# Patient Record
Sex: FEMALE | Race: WHITE | Employment: OTHER | URBAN - METROPOLITAN AREA
[De-identification: names, ages, dates, MRNs, and addresses within clinical notes are randomized per-mention and may not be internally consistent; named-entity substitution may affect disease eponyms.]

---

## 2021-01-01 ENCOUNTER — APPOINTMENT (OUTPATIENT)
Dept: CT IMAGING | Age: 86
DRG: 871 | End: 2021-01-01
Attending: FAMILY MEDICINE
Payer: MEDICARE

## 2021-01-01 ENCOUNTER — APPOINTMENT (OUTPATIENT)
Dept: GENERAL RADIOLOGY | Age: 86
DRG: 871 | End: 2021-01-01
Attending: EMERGENCY MEDICINE
Payer: MEDICARE

## 2021-01-01 ENCOUNTER — HOSPITAL ENCOUNTER (INPATIENT)
Age: 86
LOS: 2 days | DRG: 871 | End: 2022-01-01
Attending: EMERGENCY MEDICINE | Admitting: FAMILY MEDICINE
Payer: MEDICARE

## 2021-01-01 ENCOUNTER — APPOINTMENT (OUTPATIENT)
Dept: CT IMAGING | Age: 86
DRG: 871 | End: 2021-01-01
Attending: EMERGENCY MEDICINE
Payer: MEDICARE

## 2021-01-01 VITALS
SYSTOLIC BLOOD PRESSURE: 133 MMHG | HEART RATE: 97 BPM | BODY MASS INDEX: 30.37 KG/M2 | OXYGEN SATURATION: 95 % | WEIGHT: 177.91 LBS | DIASTOLIC BLOOD PRESSURE: 54 MMHG | RESPIRATION RATE: 22 BRPM | HEIGHT: 64 IN | TEMPERATURE: 97.5 F

## 2021-01-01 DIAGNOSIS — J96.01 ACUTE RESPIRATORY FAILURE WITH HYPOXEMIA (HCC): ICD-10-CM

## 2021-01-01 DIAGNOSIS — G93.41 METABOLIC ENCEPHALOPATHY: Primary | ICD-10-CM

## 2021-01-01 DIAGNOSIS — N39.0 URINARY TRACT INFECTION WITHOUT HEMATURIA, SITE UNSPECIFIED: ICD-10-CM

## 2021-01-01 LAB
ALBUMIN SERPL-MCNC: 2.7 G/DL (ref 3.4–5)
ALBUMIN SERPL-MCNC: 3.6 G/DL (ref 3.4–5)
ALBUMIN/GLOB SERPL: 0.6 {RATIO} (ref 0.8–1.7)
ALBUMIN/GLOB SERPL: 1 {RATIO} (ref 0.8–1.7)
ALP SERPL-CCNC: 65 U/L (ref 45–117)
ALP SERPL-CCNC: 66 U/L (ref 45–117)
ALT SERPL-CCNC: 19 U/L (ref 13–56)
ALT SERPL-CCNC: 26 U/L (ref 13–56)
ANION GAP BLD CALC-SCNC: 13 MMOL/L (ref 10–20)
ANION GAP SERPL CALC-SCNC: 3 MMOL/L (ref 3–18)
ANION GAP SERPL CALC-SCNC: 6 MMOL/L (ref 3–18)
APPEARANCE UR: ABNORMAL
ARTERIAL PATENCY WRIST A: ABNORMAL
ARTERIAL PATENCY WRIST A: POSITIVE
ARTERIAL PATENCY WRIST A: POSITIVE
AST SERPL-CCNC: 22 U/L (ref 10–38)
AST SERPL-CCNC: 32 U/L (ref 10–38)
BACTERIA URNS QL MICRO: ABNORMAL /HPF
BASE DEFICIT BLD-SCNC: 1.2 MMOL/L
BASE DEFICIT BLD-SCNC: 2.1 MMOL/L
BASE EXCESS BLD CALC-SCNC: 1.4 MMOL/L
BASE EXCESS BLD CALC-SCNC: 2.7 MMOL/L
BASOPHILS # BLD: 0 K/UL (ref 0–0.1)
BASOPHILS NFR BLD: 0 % (ref 0–2)
BDY SITE: ABNORMAL
BILIRUB SERPL-MCNC: 0.6 MG/DL (ref 0.2–1)
BILIRUB SERPL-MCNC: 0.7 MG/DL (ref 0.2–1)
BILIRUB UR QL: NEGATIVE
BNP SERPL-MCNC: ABNORMAL PG/ML (ref 0–1800)
BODY TEMPERATURE: 98
BODY TEMPERATURE: 98.2
BUN SERPL-MCNC: 38 MG/DL (ref 7–18)
BUN SERPL-MCNC: 44 MG/DL (ref 7–18)
BUN/CREAT SERPL: 22 (ref 12–20)
BUN/CREAT SERPL: 23 (ref 12–20)
CA-I BLD-MCNC: 1.19 MMOL/L (ref 1.12–1.32)
CALCIUM SERPL-MCNC: 8.8 MG/DL (ref 8.5–10.1)
CALCIUM SERPL-MCNC: 8.9 MG/DL (ref 8.5–10.1)
CHLORIDE BLD-SCNC: 107 MMOL/L (ref 98–107)
CHLORIDE SERPL-SCNC: 112 MMOL/L (ref 100–111)
CHLORIDE SERPL-SCNC: 117 MMOL/L (ref 100–111)
CO2 BLD-SCNC: 30 MMOL/L (ref 19–24)
CO2 SERPL-SCNC: 25 MMOL/L (ref 21–32)
CO2 SERPL-SCNC: 29 MMOL/L (ref 21–32)
COLOR UR: YELLOW
COVID-19 RAPID TEST, COVR: NOT DETECTED
CREAT BLD-MCNC: 2.09 MG/DL (ref 0.6–1.3)
CREAT SERPL-MCNC: 1.7 MG/DL (ref 0.6–1.3)
CREAT SERPL-MCNC: 1.88 MG/DL (ref 0.6–1.3)
CRP SERPL-MCNC: 6.9 MG/DL (ref 0–0.3)
D DIMER PPP FEU-MCNC: 1.06 UG/ML(FEU)
DIFFERENTIAL METHOD BLD: ABNORMAL
EOSINOPHIL # BLD: 0.1 K/UL (ref 0–0.4)
EOSINOPHIL NFR BLD: 1 % (ref 0–5)
EPITH CASTS URNS QL MICRO: ABNORMAL /LPF (ref 0–5)
ERYTHROCYTE [DISTWIDTH] IN BLOOD BY AUTOMATED COUNT: 15.9 % (ref 11.6–14.5)
ERYTHROCYTE [DISTWIDTH] IN BLOOD BY AUTOMATED COUNT: 15.9 % (ref 11.6–14.5)
GAS FLOW.O2 O2 DELIVERY SYS: ABNORMAL L/MIN
GAS FLOW.O2 SETTING OXYMISER: 24 BPM
GLOBULIN SER CALC-MCNC: 3.6 G/DL (ref 2–4)
GLOBULIN SER CALC-MCNC: 4.3 G/DL (ref 2–4)
GLUCOSE BLD STRIP.AUTO-MCNC: 90 MG/DL (ref 70–110)
GLUCOSE BLD-MCNC: 101 MG/DL (ref 65–100)
GLUCOSE SERPL-MCNC: 105 MG/DL (ref 74–99)
GLUCOSE SERPL-MCNC: 83 MG/DL (ref 74–99)
GLUCOSE UR STRIP.AUTO-MCNC: NEGATIVE MG/DL
HCO3 BLD-SCNC: 23.7 MMOL/L (ref 22–26)
HCO3 BLD-SCNC: 26.4 MMOL/L (ref 22–26)
HCO3 BLD-SCNC: 26.5 MMOL/L (ref 22–26)
HCO3 BLD-SCNC: 29.6 MMOL/L (ref 22–26)
HCT VFR BLD AUTO: 29.9 % (ref 35–45)
HCT VFR BLD AUTO: 32.2 % (ref 35–45)
HGB BLD-MCNC: 8.8 G/DL (ref 12–16)
HGB BLD-MCNC: 9.5 G/DL (ref 12–16)
HGB UR QL STRIP: ABNORMAL
IMM GRANULOCYTES # BLD AUTO: 0 K/UL (ref 0–0.04)
IMM GRANULOCYTES NFR BLD AUTO: 1 % (ref 0–0.5)
KETONES UR QL STRIP.AUTO: NEGATIVE MG/DL
LACTATE BLD-SCNC: 0.91 MMOL/L (ref 0.4–2)
LDH SERPL L TO P-CCNC: 434 U/L (ref 81–234)
LEUKOCYTE ESTERASE UR QL STRIP.AUTO: ABNORMAL
LIPASE SERPL-CCNC: 146 U/L (ref 73–393)
LYMPHOCYTES # BLD: 0.9 K/UL (ref 0.9–3.6)
LYMPHOCYTES NFR BLD: 10 % (ref 21–52)
MAGNESIUM SERPL-MCNC: 2.6 MG/DL (ref 1.6–2.6)
MCH RBC QN AUTO: 32 PG (ref 24–34)
MCH RBC QN AUTO: 33 PG (ref 24–34)
MCHC RBC AUTO-ENTMCNC: 29.4 G/DL (ref 31–37)
MCHC RBC AUTO-ENTMCNC: 29.5 G/DL (ref 31–37)
MCV RBC AUTO: 108.4 FL (ref 78–100)
MCV RBC AUTO: 112 FL (ref 78–100)
MONOCYTES # BLD: 0.6 K/UL (ref 0.05–1.2)
MONOCYTES NFR BLD: 7 % (ref 3–10)
NEUTS SEG # BLD: 7.1 K/UL (ref 1.8–8)
NEUTS SEG NFR BLD: 82 % (ref 40–73)
NITRITE UR QL STRIP.AUTO: POSITIVE
NRBC # BLD: 0.02 K/UL (ref 0–0.01)
NRBC # BLD: 0.03 K/UL (ref 0–0.01)
NRBC BLD-RTO: 0.2 PER 100 WBC
NRBC BLD-RTO: 0.3 PER 100 WBC
O2/TOTAL GAS SETTING VFR VENT: 44 %
O2/TOTAL GAS SETTING VFR VENT: 50 %
PCO2 BLD: 42.8 MMHG (ref 35–45)
PCO2 BLD: 44.5 MMHG (ref 35–45)
PCO2 BLD: 58.5 MMHG (ref 35–45)
PCO2 BLDV: 56.2 MMHG (ref 41–51)
PH BLD: 7.26 [PH] (ref 7.35–7.45)
PH BLD: 7.34 [PH] (ref 7.35–7.45)
PH BLD: 7.4 [PH] (ref 7.35–7.45)
PH BLDV: 7.33 [PH] (ref 7.32–7.42)
PH UR STRIP: 5 [PH] (ref 5–8)
PLATELET # BLD AUTO: 232 K/UL (ref 135–420)
PLATELET # BLD AUTO: 242 K/UL (ref 135–420)
PMV BLD AUTO: 10.8 FL (ref 9.2–11.8)
PMV BLD AUTO: 11.3 FL (ref 9.2–11.8)
PO2 BLD: 59 MMHG (ref 80–100)
PO2 BLD: 62 MMHG (ref 80–100)
PO2 BLD: 86 MMHG (ref 80–100)
PO2 BLDV: 34 MMHG (ref 25–40)
POTASSIUM BLD-SCNC: 4.3 MMOL/L (ref 3.5–5.1)
POTASSIUM SERPL-SCNC: 4.4 MMOL/L (ref 3.5–5.5)
POTASSIUM SERPL-SCNC: 4.9 MMOL/L (ref 3.5–5.5)
PROCALCITONIN SERPL-MCNC: 0.06 NG/ML
PROT SERPL-MCNC: 7 G/DL (ref 6.4–8.2)
PROT SERPL-MCNC: 7.2 G/DL (ref 6.4–8.2)
PROT UR STRIP-MCNC: 30 MG/DL
RBC # BLD AUTO: 2.67 M/UL (ref 4.2–5.3)
RBC # BLD AUTO: 2.97 M/UL (ref 4.2–5.3)
RBC #/AREA URNS HPF: ABNORMAL /HPF (ref 0–5)
SAO2 % BLD: 59 %
SAO2 % BLD: 89.7 % (ref 92–97)
SAO2 % BLD: 90.5 % (ref 92–97)
SAO2 % BLD: 94.8 % (ref 92–97)
SERVICE CMNT-IMP: ABNORMAL
SODIUM BLD-SCNC: 149 MMOL/L (ref 136–145)
SODIUM SERPL-SCNC: 144 MMOL/L (ref 136–145)
SODIUM SERPL-SCNC: 148 MMOL/L (ref 136–145)
SOURCE, COVRS: NORMAL
SP GR UR REFRACTOMETRY: 1.02 (ref 1–1.03)
SPECIMEN SITE: ABNORMAL
SPECIMEN TYPE: ABNORMAL
TOTAL RESP. RATE, ITRR: 28
TROPONIN-HIGH SENSITIVITY: 37 NG/L (ref 0–54)
TSH SERPL DL<=0.05 MIU/L-ACNC: 2.57 UIU/ML (ref 0.36–3.74)
UROBILINOGEN UR QL STRIP.AUTO: 0.2 EU/DL (ref 0.2–1)
WBC # BLD AUTO: 11.8 K/UL (ref 4.6–13.2)
WBC # BLD AUTO: 8.7 K/UL (ref 4.6–13.2)
WBC URNS QL MICRO: ABNORMAL /HPF (ref 0–5)

## 2021-01-01 PROCEDURE — 96375 TX/PRO/DX INJ NEW DRUG ADDON: CPT

## 2021-01-01 PROCEDURE — 94640 AIRWAY INHALATION TREATMENT: CPT

## 2021-01-01 PROCEDURE — 83690 ASSAY OF LIPASE: CPT

## 2021-01-01 PROCEDURE — 87040 BLOOD CULTURE FOR BACTERIA: CPT

## 2021-01-01 PROCEDURE — 85379 FIBRIN DEGRADATION QUANT: CPT

## 2021-01-01 PROCEDURE — 82803 BLOOD GASES ANY COMBINATION: CPT

## 2021-01-01 PROCEDURE — 77010033678 HC OXYGEN DAILY

## 2021-01-01 PROCEDURE — 71045 X-RAY EXAM CHEST 1 VIEW: CPT

## 2021-01-01 PROCEDURE — 85025 COMPLETE CBC W/AUTO DIFF WBC: CPT

## 2021-01-01 PROCEDURE — 36600 WITHDRAWAL OF ARTERIAL BLOOD: CPT

## 2021-01-01 PROCEDURE — 96361 HYDRATE IV INFUSION ADD-ON: CPT

## 2021-01-01 PROCEDURE — 96365 THER/PROPH/DIAG IV INF INIT: CPT

## 2021-01-01 PROCEDURE — 81001 URINALYSIS AUTO W/SCOPE: CPT

## 2021-01-01 PROCEDURE — 74011000250 HC RX REV CODE- 250: Performed by: FAMILY MEDICINE

## 2021-01-01 PROCEDURE — 82962 GLUCOSE BLOOD TEST: CPT

## 2021-01-01 PROCEDURE — 93005 ELECTROCARDIOGRAM TRACING: CPT

## 2021-01-01 PROCEDURE — P9047 ALBUMIN (HUMAN), 25%, 50ML: HCPCS | Performed by: FAMILY MEDICINE

## 2021-01-01 PROCEDURE — 82947 ASSAY GLUCOSE BLOOD QUANT: CPT

## 2021-01-01 PROCEDURE — 87186 SC STD MICRODIL/AGAR DIL: CPT

## 2021-01-01 PROCEDURE — 70450 CT HEAD/BRAIN W/O DYE: CPT

## 2021-01-01 PROCEDURE — 96360 HYDRATION IV INFUSION INIT: CPT

## 2021-01-01 PROCEDURE — 83735 ASSAY OF MAGNESIUM: CPT

## 2021-01-01 PROCEDURE — 86140 C-REACTIVE PROTEIN: CPT

## 2021-01-01 PROCEDURE — 83615 LACTATE (LD) (LDH) ENZYME: CPT

## 2021-01-01 PROCEDURE — 71250 CT THORAX DX C-: CPT

## 2021-01-01 PROCEDURE — 84145 PROCALCITONIN (PCT): CPT

## 2021-01-01 PROCEDURE — 74011250636 HC RX REV CODE- 250/636: Performed by: EMERGENCY MEDICINE

## 2021-01-01 PROCEDURE — 84484 ASSAY OF TROPONIN QUANT: CPT

## 2021-01-01 PROCEDURE — 83880 ASSAY OF NATRIURETIC PEPTIDE: CPT

## 2021-01-01 PROCEDURE — 74011000258 HC RX REV CODE- 258: Performed by: EMERGENCY MEDICINE

## 2021-01-01 PROCEDURE — 74011250636 HC RX REV CODE- 250/636: Performed by: FAMILY MEDICINE

## 2021-01-01 PROCEDURE — 74011250636 HC RX REV CODE- 250/636: Performed by: INTERNAL MEDICINE

## 2021-01-01 PROCEDURE — 36415 COLL VENOUS BLD VENIPUNCTURE: CPT

## 2021-01-01 PROCEDURE — 87635 SARS-COV-2 COVID-19 AMP PRB: CPT

## 2021-01-01 PROCEDURE — 99285 EMERGENCY DEPT VISIT HI MDM: CPT

## 2021-01-01 PROCEDURE — 65660000000 HC RM CCU STEPDOWN

## 2021-01-01 PROCEDURE — 76450000000

## 2021-01-01 PROCEDURE — 80053 COMPREHEN METABOLIC PANEL: CPT

## 2021-01-01 PROCEDURE — 87077 CULTURE AEROBIC IDENTIFY: CPT

## 2021-01-01 PROCEDURE — 85027 COMPLETE CBC AUTOMATED: CPT

## 2021-01-01 PROCEDURE — 77030013140 HC MSK NEB VYRM -A

## 2021-01-01 PROCEDURE — 87086 URINE CULTURE/COLONY COUNT: CPT

## 2021-01-01 PROCEDURE — 84443 ASSAY THYROID STIM HORMONE: CPT

## 2021-01-01 RX ORDER — DEXAMETHASONE SODIUM PHOSPHATE 100 MG/10ML
10 INJECTION INTRAMUSCULAR; INTRAVENOUS EVERY 24 HOURS
Status: DISCONTINUED | OUTPATIENT
Start: 2022-01-05 | End: 2021-01-01

## 2021-01-01 RX ORDER — ACETAMINOPHEN 650 MG/1
650 SUPPOSITORY RECTAL
Status: DISCONTINUED | OUTPATIENT
Start: 2021-01-01 | End: 2021-01-01

## 2021-01-01 RX ORDER — ONDANSETRON 2 MG/ML
4 INJECTION INTRAMUSCULAR; INTRAVENOUS
Status: DISCONTINUED | OUTPATIENT
Start: 2021-01-01 | End: 2022-01-01 | Stop reason: HOSPADM

## 2021-01-01 RX ORDER — ONDANSETRON 2 MG/ML
4 INJECTION INTRAMUSCULAR; INTRAVENOUS
Status: DISCONTINUED | OUTPATIENT
Start: 2021-01-01 | End: 2021-01-01

## 2021-01-01 RX ORDER — LEVALBUTEROL INHALATION SOLUTION 0.63 MG/3ML
0.63 SOLUTION RESPIRATORY (INHALATION)
Status: DISCONTINUED | OUTPATIENT
Start: 2021-01-01 | End: 2022-01-01 | Stop reason: HOSPADM

## 2021-01-01 RX ORDER — MORPHINE SULFATE 20 MG/ML
10 SOLUTION ORAL
Status: DISCONTINUED | OUTPATIENT
Start: 2021-01-01 | End: 2022-01-01 | Stop reason: HOSPADM

## 2021-01-01 RX ORDER — ACETAMINOPHEN 325 MG/1
650 TABLET ORAL
Status: DISCONTINUED | OUTPATIENT
Start: 2021-01-01 | End: 2021-01-01 | Stop reason: SDUPTHER

## 2021-01-01 RX ORDER — ONDANSETRON 4 MG/1
4 TABLET, ORALLY DISINTEGRATING ORAL
Status: DISCONTINUED | OUTPATIENT
Start: 2021-01-01 | End: 2022-01-01 | Stop reason: HOSPADM

## 2021-01-01 RX ORDER — HALOPERIDOL 5 MG/ML
2 INJECTION INTRAMUSCULAR
Status: DISCONTINUED | OUTPATIENT
Start: 2021-01-01 | End: 2022-01-01 | Stop reason: HOSPADM

## 2021-01-01 RX ORDER — ALBUMIN HUMAN 250 G/1000ML
25 SOLUTION INTRAVENOUS EVERY 6 HOURS
Status: DISCONTINUED | OUTPATIENT
Start: 2021-01-01 | End: 2021-01-01

## 2021-01-01 RX ORDER — LORAZEPAM 2 MG/ML
1 INJECTION INTRAMUSCULAR
Status: DISCONTINUED | OUTPATIENT
Start: 2021-01-01 | End: 2022-01-01 | Stop reason: HOSPADM

## 2021-01-01 RX ORDER — LANOLIN ALCOHOL/MO/W.PET/CERES
1000 CREAM (GRAM) TOPICAL DAILY
Status: DISCONTINUED | OUTPATIENT
Start: 2021-01-01 | End: 2021-01-01

## 2021-01-01 RX ORDER — POLYETHYLENE GLYCOL 3350 17 G/17G
17 POWDER, FOR SOLUTION ORAL DAILY PRN
Status: DISCONTINUED | OUTPATIENT
Start: 2021-01-01 | End: 2022-01-01 | Stop reason: HOSPADM

## 2021-01-01 RX ORDER — ACETAMINOPHEN 650 MG/1
650 SUPPOSITORY RECTAL
Status: DISCONTINUED | OUTPATIENT
Start: 2021-01-01 | End: 2021-01-01 | Stop reason: SDUPTHER

## 2021-01-01 RX ORDER — ACETAMINOPHEN 325 MG/1
650 TABLET ORAL
Status: DISCONTINUED | OUTPATIENT
Start: 2021-01-01 | End: 2021-01-01

## 2021-01-01 RX ORDER — ONDANSETRON 2 MG/ML
4 INJECTION INTRAMUSCULAR; INTRAVENOUS
Status: DISCONTINUED | OUTPATIENT
Start: 2021-01-01 | End: 2021-01-01 | Stop reason: SDUPTHER

## 2021-01-01 RX ORDER — LEVOFLOXACIN 5 MG/ML
500 INJECTION, SOLUTION INTRAVENOUS
Status: DISCONTINUED | OUTPATIENT
Start: 2022-01-01 | End: 2021-01-01

## 2021-01-01 RX ORDER — ATORVASTATIN CALCIUM 20 MG/1
40 TABLET, FILM COATED ORAL
Status: DISCONTINUED | OUTPATIENT
Start: 2021-01-01 | End: 2021-01-01

## 2021-01-01 RX ORDER — SODIUM CHLORIDE 0.9 % (FLUSH) 0.9 %
5-40 SYRINGE (ML) INJECTION EVERY 8 HOURS
Status: DISCONTINUED | OUTPATIENT
Start: 2021-01-01 | End: 2021-01-01 | Stop reason: SDUPTHER

## 2021-01-01 RX ORDER — IPRATROPIUM BROMIDE AND ALBUTEROL SULFATE 2.5; .5 MG/3ML; MG/3ML
3 SOLUTION RESPIRATORY (INHALATION)
Status: DISCONTINUED | OUTPATIENT
Start: 2021-01-01 | End: 2021-01-01

## 2021-01-01 RX ORDER — NALOXONE HYDROCHLORIDE 1 MG/ML
1 INJECTION INTRAMUSCULAR; INTRAVENOUS; SUBCUTANEOUS
Status: COMPLETED | OUTPATIENT
Start: 2021-01-01 | End: 2021-01-01

## 2021-01-01 RX ORDER — LORAZEPAM 2 MG/ML
1 CONCENTRATE ORAL
Status: DISCONTINUED | OUTPATIENT
Start: 2021-01-01 | End: 2022-01-01

## 2021-01-01 RX ORDER — SODIUM CHLORIDE 0.9 % (FLUSH) 0.9 %
5-40 SYRINGE (ML) INJECTION AS NEEDED
Status: DISCONTINUED | OUTPATIENT
Start: 2021-01-01 | End: 2021-01-01 | Stop reason: SDUPTHER

## 2021-01-01 RX ORDER — LEVOFLOXACIN 5 MG/ML
750 INJECTION, SOLUTION INTRAVENOUS EVERY 24 HOURS
Status: DISCONTINUED | OUTPATIENT
Start: 2021-01-01 | End: 2021-01-01 | Stop reason: DRUGHIGH

## 2021-01-01 RX ORDER — ONDANSETRON 4 MG/1
4 TABLET, ORALLY DISINTEGRATING ORAL
Status: DISCONTINUED | OUTPATIENT
Start: 2021-01-01 | End: 2021-01-01 | Stop reason: SDUPTHER

## 2021-01-01 RX ORDER — SODIUM CHLORIDE 0.9 % (FLUSH) 0.9 %
5-40 SYRINGE (ML) INJECTION AS NEEDED
Status: DISCONTINUED | OUTPATIENT
Start: 2021-01-01 | End: 2021-01-01

## 2021-01-01 RX ORDER — LEVOFLOXACIN 5 MG/ML
750 INJECTION, SOLUTION INTRAVENOUS ONCE
Status: COMPLETED | OUTPATIENT
Start: 2021-01-01 | End: 2021-01-01

## 2021-01-01 RX ORDER — SODIUM CHLORIDE 0.9 % (FLUSH) 0.9 %
5-40 SYRINGE (ML) INJECTION EVERY 8 HOURS
Status: DISCONTINUED | OUTPATIENT
Start: 2021-01-01 | End: 2021-01-01

## 2021-01-01 RX ORDER — MORPHINE SULFATE 2 MG/ML
2 INJECTION, SOLUTION INTRAMUSCULAR; INTRAVENOUS
Status: DISCONTINUED | OUTPATIENT
Start: 2021-01-01 | End: 2022-01-01 | Stop reason: HOSPADM

## 2021-01-01 RX ADMIN — Medication 10 ML: at 06:05

## 2021-01-01 RX ADMIN — ALBUMIN (HUMAN) 25 G: 0.25 INJECTION, SOLUTION INTRAVENOUS at 12:05

## 2021-01-01 RX ADMIN — SODIUM CHLORIDE 1000 ML: 900 INJECTION, SOLUTION INTRAVENOUS at 02:18

## 2021-01-01 RX ADMIN — SODIUM CHLORIDE 1000 ML: 900 INJECTION, SOLUTION INTRAVENOUS at 04:17

## 2021-01-01 RX ADMIN — SODIUM CHLORIDE 1000 ML: 900 INJECTION, SOLUTION INTRAVENOUS at 02:17

## 2021-01-01 RX ADMIN — CEFEPIME HYDROCHLORIDE 2 G: 2 INJECTION, POWDER, FOR SOLUTION INTRAVENOUS at 04:23

## 2021-01-01 RX ADMIN — ALBUMIN (HUMAN) 25 G: 0.25 INJECTION, SOLUTION INTRAVENOUS at 17:46

## 2021-01-01 RX ADMIN — LORAZEPAM 1 MG: 2 INJECTION INTRAMUSCULAR; INTRAVENOUS at 22:19

## 2021-01-01 RX ADMIN — CEFEPIME HYDROCHLORIDE 2 G: 2 INJECTION, POWDER, FOR SOLUTION INTRAVENOUS at 04:50

## 2021-01-01 RX ADMIN — MORPHINE SULFATE 2 MG: 2 INJECTION, SOLUTION INTRAMUSCULAR; INTRAVENOUS at 22:36

## 2021-01-01 RX ADMIN — ALBUMIN (HUMAN) 25 G: 0.25 INJECTION, SOLUTION INTRAVENOUS at 23:21

## 2021-01-01 RX ADMIN — FAMOTIDINE 20 MG: 10 INJECTION, SOLUTION INTRAVENOUS at 09:22

## 2021-01-01 RX ADMIN — ALBUMIN (HUMAN) 25 G: 0.25 INJECTION, SOLUTION INTRAVENOUS at 05:24

## 2021-01-01 RX ADMIN — MORPHINE SULFATE 2 MG: 2 INJECTION, SOLUTION INTRAMUSCULAR; INTRAVENOUS at 14:53

## 2021-01-01 RX ADMIN — LEVALBUTEROL HYDROCHLORIDE 0.63 MG: 0.63 SOLUTION RESPIRATORY (INHALATION) at 05:27

## 2021-01-01 RX ADMIN — ALBUMIN (HUMAN) 25 G: 0.25 INJECTION, SOLUTION INTRAVENOUS at 12:26

## 2021-01-01 RX ADMIN — HALOPERIDOL LACTATE 2 MG: 5 INJECTION, SOLUTION INTRAMUSCULAR at 08:31

## 2021-01-01 RX ADMIN — FAMOTIDINE 20 MG: 10 INJECTION, SOLUTION INTRAVENOUS at 08:31

## 2021-01-01 RX ADMIN — LEVOFLOXACIN 750 MG: 5 INJECTION, SOLUTION INTRAVENOUS at 06:08

## 2021-01-01 RX ADMIN — LORAZEPAM 1 MG: 2 INJECTION INTRAMUSCULAR; INTRAVENOUS at 14:53

## 2021-01-01 RX ADMIN — NALOXONE HYDROCHLORIDE 1 MG: 1 INJECTION PARENTERAL at 02:09

## 2021-12-30 PROBLEM — N18.9 ACUTE ON CHRONIC RENAL FAILURE (HCC): Status: ACTIVE | Noted: 2021-01-01

## 2021-12-30 PROBLEM — J96.01 ACUTE RESPIRATORY FAILURE WITH HYPOXEMIA (HCC): Status: ACTIVE | Noted: 2021-01-01

## 2021-12-30 PROBLEM — F03.90 DEMENTIA (HCC): Status: ACTIVE | Noted: 2021-01-01

## 2021-12-30 PROBLEM — E87.0 HYPERNATREMIA: Status: ACTIVE | Noted: 2021-01-01

## 2021-12-30 PROBLEM — G93.41 ACUTE METABOLIC ENCEPHALOPATHY: Status: ACTIVE | Noted: 2021-01-01

## 2021-12-30 PROBLEM — T68.XXXA HYPOTHERMIA: Status: ACTIVE | Noted: 2021-01-01

## 2021-12-30 PROBLEM — I10 HTN (HYPERTENSION): Status: ACTIVE | Noted: 2021-01-01

## 2021-12-30 PROBLEM — Z79.01 ANTICOAGULATED: Status: ACTIVE | Noted: 2021-01-01

## 2021-12-30 PROBLEM — N18.30 CKD (CHRONIC KIDNEY DISEASE) STAGE 3, GFR 30-59 ML/MIN (HCC): Status: ACTIVE | Noted: 2021-01-01

## 2021-12-30 PROBLEM — N17.9 ACUTE ON CHRONIC RENAL FAILURE (HCC): Status: ACTIVE | Noted: 2021-01-01

## 2021-12-30 PROBLEM — N39.0 UTI (URINARY TRACT INFECTION): Status: ACTIVE | Noted: 2021-01-01

## 2021-12-30 PROBLEM — Z86.73 HISTORY OF STROKE: Status: ACTIVE | Noted: 2021-01-01

## 2021-12-30 PROBLEM — I48.20 CHRONIC ATRIAL FIBRILLATION (HCC): Status: ACTIVE | Noted: 2021-01-01

## 2021-12-30 NOTE — ED PROVIDER NOTES
EMERGENCY DEPARTMENT HISTORY AND PHYSICAL EXAM    1:18 AM    Date: 12/30/2021  Patient Name: Clara Cadena    History of Presenting Illness     Chief Complaint   Patient presents with    Shortness of Breath       History Provided By: Patient  Location/Duration/Severity/Modifying factors   Patient is a 51-year-old female who presents to the ED with a chief complaint of seemingly altered mental status. Patient comes from a nursing care facility. Per the facility the patient typically at baseline is disoriented but conversant. Patient reportedly this evening patient became altered and reportedly scratching her tongue and they checked her oxygen level and it was in the low 80s. That was when they dispatched EMS. The patient is quite encephalopathic appearing and not able to provide a history. Per EMS the patient's sat was 83% patient on 6 L and then transferred to a nonrebreather which improved her saturations to the upper 90s however on arrival here she is on room air with a saturation of 90%. Patient reportedly was saying that her tongue was swelling although on exam the tongue looks to be dry but normal otherwise.           PCP: Igor Ingram MD    Current Facility-Administered Medications   Medication Dose Route Frequency Provider Last Rate Last Admin    [START ON 1/1/2022] levoFLOXacin (LEVAQUIN) 500 mg in D5W IVPB  500 mg IntraVENous Q48H Franc Cherry DO        cefepime (MAXIPIME) 2 g in 0.9% sodium chloride (MBP/ADV) 100 mL MBP  2 g IntraVENous Q24H Aniya Snyder DO   IV Completed at 12/30/21 0608    sodium chloride (NS) flush 5-40 mL  5-40 mL IntraVENous Q8H Justine Moran MD   10 mL at 12/30/21 8952    sodium chloride (NS) flush 5-40 mL  5-40 mL IntraVENous PRN Justine Moran MD        acetaminophen (TYLENOL) tablet 650 mg  650 mg Oral Q6H PRN Justine Moran MD        Or    acetaminophen (TYLENOL) suppository 650 mg  650 mg Rectal Q6H PRN Justine Moran MD  polyethylene glycol (MIRALAX) packet 17 g  17 g Oral DAILY PRN Liz Rodriguez MD        ondansetron (ZOFRAN ODT) tablet 4 mg  4 mg Oral Q8H PRN Liz Rodriguez MD        Or    ondansetron [facility]) injection 4 mg  4 mg IntraVENous Q6H PRN Liz Rodriguez MD        famotidine (PF) (PEPCID) 20 mg in 0.9% sodium chloride 10 mL injection  20 mg IntraVENous DAILY Liz Rodriguez MD   20 mg at 12/30/21 8624    apixaban (ELIQUIS) tablet 2.5 mg  2.5 mg Oral BID Liz Rodriguez MD        atorvastatin (LIPITOR) tablet 40 mg  40 mg Oral QHS Liz Rodriguez MD        cyanocobalamin tablet 1,000 mcg  1,000 mcg Oral DAILY Liz Rodriguez MD        albumin human 25% (BUMINATE) solution 25 g  25 g IntraVENous Q6H Liz Rodriguez MD   25 g at 12/30/21 1205    albuterol-ipratropium (DUO-NEB) 2.5 MG-0.5 MG/3 ML  3 mL Nebulization Q4H PRN Tracie Gandhi MD           Past History     Past Medical History:  Past Medical History:   Diagnosis Date    Anemia     ARF (acute respiratory failure) (HCC)     Atrial fibrillation (HCC)     Cerebral infarction (HCC)     Chronic kidney disease     Dementia (HCC)     Dysphagia     GERD (gastroesophageal reflux disease)     Hypercholesterolemia     Hypertension     Pleural effusion     Polyneuropathy     UTI (urinary tract infection)        Past Surgical History:  Past Surgical History:   Procedure Laterality Date    HX APPENDECTOMY      HX CATARACT REMOVAL      HX ORTHOPAEDIC      L knee, right ankle, joint replacement, hammer toe release, bunionectomy       Family History:  Family History   Problem Relation Age of Onset    Breast Cancer Mother     COPD Father     Breast Cancer Sister        Social History:  Social History     Tobacco Use    Smoking status: Not on file    Smokeless tobacco: Not on file   Substance Use Topics    Alcohol use: Not on file    Drug use: Not on file       Allergies:   Allergies Allergen Reactions    Alteplase Angioedema    Celebrex [Celecoxib] Unknown (comments)     Medication reaction not listed on paperwork from facility    Enalapril Unknown (comments)     Medication reaction not listed on paperwork from facility    Meloxicam Unknown (comments)     Medication reaction not listed on paperwork from facility       I reviewed and confirmed the above information with patient and updated as necessary. Review of Systems     Review of Systems   Unable to perform ROS: Mental status change       Physical Exam     Visit Vitals  BP (!) 122/53 (BP 1 Location: Left upper arm, BP Patient Position: At rest;Lying)   Pulse 77   Temp (!) 96.5 °F (35.8 °C)   Resp 26   Ht 5' 4\" (1.626 m)   Wt 77.1 kg (170 lb)   SpO2 93%   BMI 29.18 kg/m²       Physical Exam  Vitals and nursing note reviewed. Constitutional:       General: She is not in acute distress. Appearance: Normal appearance. She is normal weight. Comments: Acute and chronically ill-appearing, not in overt distress not responding appropriately to questioning. HENT:      Head: Normocephalic and atraumatic. Right Ear: External ear normal.      Left Ear: External ear normal.      Nose: Nose normal.      Mouth/Throat:      Mouth: Mucous membranes are moist.   Eyes:      Conjunctiva/sclera: Conjunctivae normal.   Cardiovascular:      Rate and Rhythm: Normal rate and regular rhythm. Pulses: Normal pulses. Heart sounds: Normal heart sounds. No murmur heard. Pulmonary:      Effort: Pulmonary effort is normal.      Breath sounds: Normal breath sounds. No wheezing, rhonchi or rales. Abdominal:      General: Abdomen is flat. Tenderness: There is no abdominal tenderness. There is no guarding or rebound. Musculoskeletal:         General: No swelling or tenderness. Normal range of motion. Cervical back: Normal range of motion and neck supple. Right lower leg: No edema. Left lower leg: No edema. Skin:     General: Skin is warm and dry. Capillary Refill: Capillary refill takes less than 2 seconds. Findings: No rash. Neurological:      General: No focal deficit present. Mental Status: She is alert. Diagnostic Study Results     Labs -  Recent Results (from the past 24 hour(s))   BLOOD GAS, ARTERIAL POC    Collection Time: 12/30/21  1:30 AM   Result Value Ref Range    Device: NASAL CANNULA      pH (POC) 7.40 7.35 - 7.45      pCO2 (POC) 42.8 35.0 - 45.0 MMHG    pO2 (POC) 59 (L) 80 - 100 MMHG    HCO3 (POC) 26.5 (H) 22 - 26 MMOL/L    sO2 (POC) 90.5 (L) 92 - 97 %    Base excess (POC) 1.4 mmol/L    Allens test (POC) Positive      Site RIGHT RADIAL      Patient temp. 98      Specimen type (POC) ARTERIAL      Performed by Ermelinda Clifton    EKG, 12 LEAD, INITIAL    Collection Time: 12/30/21  1:35 AM   Result Value Ref Range    Ventricular Rate 75 BPM    QRS Duration 82 ms    Q-T Interval 398 ms    QTC Calculation (Bezet) 444 ms    Calculated R Axis -29 degrees    Calculated T Axis -4 degrees    Diagnosis       Atrial fibrillation  Abnormal ECG  No previous ECGs available     CBC WITH AUTOMATED DIFF    Collection Time: 12/30/21  2:02 AM   Result Value Ref Range    WBC 8.7 4.6 - 13.2 K/uL    RBC 2.97 (L) 4.20 - 5.30 M/uL    HGB 9.5 (L) 12.0 - 16.0 g/dL    HCT 32.2 (L) 35.0 - 45.0 %    .4 (H) 78.0 - 100.0 FL    MCH 32.0 24.0 - 34.0 PG    MCHC 29.5 (L) 31.0 - 37.0 g/dL    RDW 15.9 (H) 11.6 - 14.5 %    PLATELET 468 921 - 801 K/uL    MPV 11.3 9.2 - 11.8 FL    NRBC 0.2 (H) 0  WBC    ABSOLUTE NRBC 0.02 (H) 0.00 - 0.01 K/uL    NEUTROPHILS 82 (H) 40 - 73 %    LYMPHOCYTES 10 (L) 21 - 52 %    MONOCYTES 7 3 - 10 %    EOSINOPHILS 1 0 - 5 %    BASOPHILS 0 0 - 2 %    IMMATURE GRANULOCYTES 1 (H) 0.0 - 0.5 %    ABS. NEUTROPHILS 7.1 1.8 - 8.0 K/UL    ABS. LYMPHOCYTES 0.9 0.9 - 3.6 K/UL    ABS. MONOCYTES 0.6 0.05 - 1.2 K/UL    ABS. EOSINOPHILS 0.1 0.0 - 0.4 K/UL    ABS.  BASOPHILS 0.0 0.0 - 0.1 K/UL ABS. IMM. GRANS. 0.0 0.00 - 0.04 K/UL    DF AUTOMATED     METABOLIC PANEL, COMPREHENSIVE    Collection Time: 12/30/21  2:02 AM   Result Value Ref Range    Sodium 144 136 - 145 mmol/L    Potassium 4.9 3.5 - 5.5 mmol/L    Chloride 112 (H) 100 - 111 mmol/L    CO2 29 21 - 32 mmol/L    Anion gap 3 3.0 - 18 mmol/L    Glucose 105 (H) 74 - 99 mg/dL    BUN 44 (H) 7.0 - 18 MG/DL    Creatinine 1.88 (H) 0.6 - 1.3 MG/DL    BUN/Creatinine ratio 23 (H) 12 - 20      GFR est AA 30 (L) >60 ml/min/1.73m2    GFR est non-AA 25 (L) >60 ml/min/1.73m2    Calcium 8.9 8.5 - 10.1 MG/DL    Bilirubin, total 0.7 0.2 - 1.0 MG/DL    ALT (SGPT) 19 13 - 56 U/L    AST (SGOT) 32 10 - 38 U/L    Alk.  phosphatase 65 45 - 117 U/L    Protein, total 7.0 6.4 - 8.2 g/dL    Albumin 2.7 (L) 3.4 - 5.0 g/dL    Globulin 4.3 (H) 2.0 - 4.0 g/dL    A-G Ratio 0.6 (L) 0.8 - 1.7     NT-PRO BNP    Collection Time: 12/30/21  2:02 AM   Result Value Ref Range    NT pro-BNP 11,027 (H) 0 - 1,800 PG/ML   TROPONIN-HIGH SENSITIVITY    Collection Time: 12/30/21  2:02 AM   Result Value Ref Range    Troponin-High Sensitivity 37 0 - 54 ng/L   LIPASE    Collection Time: 12/30/21  2:02 AM   Result Value Ref Range    Lipase 146 73 - 393 U/L   MAGNESIUM    Collection Time: 12/30/21  2:02 AM   Result Value Ref Range    Magnesium 2.6 1.6 - 2.6 mg/dL   CULTURE, BLOOD    Collection Time: 12/30/21  2:02 AM    Specimen: Blood   Result Value Ref Range    Special Requests: LEFT FOREARM      Culture result: NO GROWTH AFTER 8 HOURS     CULTURE, BLOOD    Collection Time: 12/30/21  2:02 AM    Specimen: Blood   Result Value Ref Range    Special Requests: RIGHT FOREARM      Culture result: NO GROWTH AFTER 8 HOURS     BLOOD GAS,CHEM8,LACTIC ACID POC    Collection Time: 12/30/21  2:02 AM   Result Value Ref Range    Calcium, ionized (POC) 1.19 1.12 - 1.32 mmol/L    Base excess (POC) 2.7 mmol/L    HCO3 (POC) 29.6 (H) 22 - 26 MMOL/L    CO2, POC 30 (H) 19 - 24 MMOL/L    O2 SAT 59 %    Sample source VENOUS BLOOD      Performed by Day Ranci     Sodium (POC) 149 (H) 136 - 145 mmol/L    Potassium (POC) 4.3 3.5 - 5.1 mmol/L    Glucose (POC) 101 (H) 65 - 100 mg/dL    Creatinine (POC) 2.09 (H) 0.6 - 1.3 mg/dL    Lactic Acid (POC) 0.91 0.40 - 2.00 mmol/L    Chloride (POC) 107 98 - 107 mmol/L    Anion gap, POC 13 10 - 20      GFRAA, POC 27 (L) >60 ml/min/1.73m2    GFRNA, POC 22 (L) >60 ml/min/1.73m2    pH, venous (POC) 7.33 7.32 - 7.42      pCO2, venous (POC) 56.2 (H) 41 - 51 MMHG    pO2, venous (POC) 34 25 - 40 mmHg   TSH 3RD GENERATION    Collection Time: 12/30/21  2:02 AM   Result Value Ref Range    TSH 2.57 0.36 - 3.74 uIU/mL   URINALYSIS W/ RFLX MICROSCOPIC    Collection Time: 12/30/21  4:00 AM   Result Value Ref Range    Color YELLOW      Appearance TURBID      Specific gravity 1.019 1.005 - 1.030      pH (UA) 5.0 5.0 - 8.0      Protein 30 (A) NEG mg/dL    Glucose Negative NEG mg/dL    Ketone Negative NEG mg/dL    Bilirubin Negative NEG      Blood SMALL (A) NEG      Urobilinogen 0.2 0.2 - 1.0 EU/dL    Nitrites Positive (A) NEG      Leukocyte Esterase LARGE (A) NEG     URINE MICROSCOPIC ONLY    Collection Time: 12/30/21  4:00 AM   Result Value Ref Range    WBC TOO NUMEROUS TO COUNT 0 - 5 /hpf    RBC 0 to 3 0 - 5 /hpf    Epithelial cells FEW 0 - 5 /lpf    Bacteria 2+ (A) NEG /hpf   COVID-19 RAPID TEST    Collection Time: 12/30/21  5:30 AM   Result Value Ref Range    Specimen source Nasopharyngeal      COVID-19 rapid test Not detected NOTD           Radiologic Studies -   CT HEAD WO CONT   Final Result      1. No CT evidence of an acute intracranial abnormality - in particular, no   acute cortical infarct, hemorrhage, or mass effect. 2.  Moderate cerebral atrophy/volume loss and periventricular white matter   changes, most commonly seen with chronic microvascular disease. XR CHEST PORT   Final Result      No active cardiopulmonary disease. Right midlung atelectasis/scarring.  Probable   calcified right upper lobe granuloma. Medical Decision Making   I am the first provider for this patient. I reviewed the vital signs, available nursing notes, past medical history, past surgical history, family history and social history. Vital Signs-Reviewed the patient's vital signs. EKG: Rate controlled Afib rate 75, no BRONWYN or STD. Overall rate controlled Afib without any overt ischemic changes    Records Reviewed: Nursing Notes, Old Medical Records, Previous Radiology Studies and Previous Laboratory Studies (Time of Review: 1:18 AM)      Provider Notes (Medical Decision Making):   MDM  Number of Diagnoses or Management Options  Metabolic encephalopathy  Urinary tract infection without hematuria, site unspecified  Diagnosis management comments: 81 yo F with AMS and possible SOB  - Metabolic encephalopathy/UTI/Pneumonia/ COVID/Stroke/Hemorrhage, etc  - UA strongly positive. - Patient hypothermic, Shanelle hugger applied and patient had improvement in mentation  - Daughter reports patient believed to be full code, however not sure, but wants intubation if needed right now. Core Measures:  For Hospitalized Patients:    1. Hospitalization Decision Time:  The decision to hospitalize the patient was made by Dr. Ismael Read at 0500  on 12/30/2021    2. Aspirin: Aspirin was not given because the patient did not present with a stroke at the time of their Emergency Department evaluation    5:00 AM  Patient is being admitted to the hospital by Dr. Ela Cook-- Hospitalist. The results of their tests and reasons for their admission have been discussed with them and/or available family. They convey agreement and understanding for the need to be admitted and for their admission diagnosis. CONDITIONS ON ADMISSION:  Sepsis is present at the time of admission. Deep Vein Thrombosis is not present at the time of admission. Thrombosis is not present at the time of admission.  Urinary Tract Infection is present at the time of admission. Pneumonia is not present at the time of admission. MRSA is not present at the time of admission. Wound infection is not present at the time of admission. Pressure Ulcer is not present at the time of admission. CLINICAL IMPRESSION:    1. Metabolic encephalopathy    2. Urinary tract infection without hematuria, site unspecified          ED Course: Progress Notes, Reevaluation, and Consults:  ED Course as of 12/30/21 1301   Thu Dec 30, 2021   0221 Patient's daughter arrived in the ED. She tells me the patient actually is a full code although would not want prolonged mechanical ventilation. I asked if the patient need to be intubated right now with unknown cause, if that would be consistent with her wishes and she advised me that it would. Patient is having some difficulty breathing and reluctant to intubate her right now although we will continue to monitor her and she is saturating well and not retaining CO2 and she does seem to be having a somewhat decline from her initial arrival here. Could be potentially a hemorrhagic or ischemic stroke although she had symmetric strength initially timeframe and the presentation overall was not entirely consistent with that however. [RENÉE]      ED Course User Index  [RENÉE] Hemanth William, DO       Procedures    Critical Care Time: CRITICAL CARE NOTE:    I have spent 47 minutes of critical care time involved in lab review, consultations with specialist, family decision-making, and documentation. During this entire length of time I was immediately available to the patient. Critical Care: The reason for providing this level of medical care for this critically ill patient was due a critical illness that impaired one or more vital organ systems such that there was a high probability of imminent or life threatening deterioration in the patients condition.  This care involved high complexity decision making to assess, manipulate, and support vital system functions, to treat this vital organ system failure and to prevent further life threatening deterioration of the patients condition. Time is exclusive of procedural and teaching time. Susanne Woods DO      Diagnosis     Clinical Impression:   1. Metabolic encephalopathy    2. Urinary tract infection without hematuria, site unspecified        Disposition: Admit    Follow-up Information    None          Patient's Medications    No medications on file       Susanne Woods DO   Emergency Medicine   December 30, 2021, 1:18 AM     This note is dictated utilizing Dragon voice recognition software. Unfortunately this leads to occasional typographical errors using the voice recognition. I apologize in advance if the situation occurs. If questions occur please do not hesitate to contact me directly. Patient was seen  and treated during the context of the COVID-19 pandemic. Contemporary protocols utilized based on the best available evidence, utilizing evolving public health  guidelines and treatment protocols.     Susanne Woods DO

## 2021-12-30 NOTE — PROGRESS NOTES
Pharmacy Dosing Services: Renal Dosing    The following medication: Pepcid was automatically dose-adjusted per THE [facility] P&T Committee Protocol, with respect to renal function. Consult provided for this   80 y.o. , female , for the indication of SUP. Pt Weight:   Wt Readings from Last 1 Encounters:   12/30/21 77.1 kg (170 lb)         Previous Regimen Pepcid 20 mg IV BID   Serum Creatinine Lab Results   Component Value Date/Time    Creatinine 1.88 (H) 12/30/2021 02:02 AM    Creatinine (POC) 2.09 (H) 12/30/2021 02:02 AM       Creatinine Clearance Estimated Creatinine Clearance: 18.8 mL/min (A) (based on SCr of 1.88 mg/dL (H)). BUN Lab Results   Component Value Date/Time    BUN 44 (H) 12/30/2021 02:02 AM       Dosage changed to:  Pepcid 20 mg IV daily    Pharmacy to continue to monitor patient daily. Will make dosage adjustments based upon changing renal function.   Signed Yosi Martinez, [address]

## 2021-12-30 NOTE — ED NOTES
Pt appears to be doing better after the sharath neb. 02 is at 93% and patient no longer appears to be in distress.

## 2021-12-30 NOTE — H&P
History & Physical    Patient: Ela Calloway MRN: [id number]  [facility]: [id number]    YOB: 1928  Age: 80 y.o. Sex: female      DOA: 12/30/2021  Primary Care Provider:  Brianna Whitlock MD      Assessment/Plan     Patient Active Problem List   Diagnosis Code    UTI (urinary tract infection) N39.0    Acute metabolic encephalopathy I70.40    Dementia ([facility] [address] F03.90    Hypothermia T68. Olevia Screen    HTN (hypertension) I10    Chronic atrial fibrillation (HCC) I48.20    CKD (chronic kidney disease) stage 3, GFR 30-59 ml/min (HCC) N18.30    Acute on chronic renal failure (HCC) N17.9, N18.9    Acute respiratory failure with hypoxemia (HCC) J96.01    History of stroke Z86.73    Anticoagulated      77-year-old female with advanced dementia, hypertension, chronic atrial fibrillation, CKD stage III, and stroke last month is admitted for acute metabolic encephalopathy, hypothermia, acute on chronic renal failure, acute respiratory failure with hypoxemia, and UTI. Acute metabolic encephalopathy likely due to UTI with hypothermia  -Telemetry  -Shanelle jack  -Albumin for further fluid resuscitation  -Antibiotic treatment with cefepime and Levaquin  -Follow-up blood and urine cultures    Acute respiratory failure with hypoxemia-no evidence of pneumonia on her chest x-ray.   I am suspicious she may have heart failure given her elevated BNP and that she is on Coreg as a home medication as well as torsemide.  -Nasal cannula oxygen as needed  -Is on antibiotics for her UTI  -Echocardiogram to assess EF  -Monitor closely for volume overload as she received 3 L of fluid in the ED    Acute on chronic renal failure-likely due to mild dehydration and UTI  -Trend creatinine  -Avoid nephrotoxins    Chronic atrial fibrillation on anticoagulation  -Hold blood pressure medications as she is hypotensive  -Continue Eliquis    Dementia-severe  -Restraints as needed to keep her safe in bed as she cannot be out of bed and walking around the hospital unassisted  -Swallow study to evaluate for aspiration especially given recent stroke  -I advised her daughter that she would qualify for hospice if she desires this route    DNR-as confirmed by her daughter. She has paperwork at home. I have entered a palliative care consult to help follow the patient during her hospital stay. Family discussion-I advised her daughter that she will likely require sedation or restraints as she is not able to get out of bed here at the hospital.  Her daughter states that she gets out of bed all the time while at rehab and sits that the nurses station. I advised her that every time she has hospitalized she will further decline cognitively and is at risk for delirium. Poor overall prognosis. Prophylaxis: Pepcid IV, Eliquis p.o. Estimated length of stay : 4-5 nights    Tamiko Savage MD  Nocturnist  -----------------------------------------------------------------------------------------------------------------------------------------------------------------------------  CC: Altered mental status, shortness of breath       HPI:     Deven Rhodes is a 80 y.o. female with advanced dementia, hypertension, chronic atrial fibrillation, CKD stage III, and stroke last month who presents via EMS for altered mental status and hypoxia at her rehab facility. The staff found her scratching at her tongue and so they checked her vital signs. Her oxygen level was in the 80s so they brought her to the hospital.  Her daughter reports that she has been in the rehab facility since her discharge last month from [facility] with a stroke. She is not sure if she has been eating or drinking since she is not usually visiting during mealtime. She says that her dementia wax and wanes rapidly. 1 day she will be very happy in the next she will not speak at all. She does not normally have an oxygen requirement.   History is unobtainable from the patient due to dementia. Past Medical History:   Diagnosis Date    Anemia     ARF (acute respiratory failure) ([facility] [address]     Atrial fibrillation (HCC)     Cerebral infarction (HCC)     Chronic kidney disease     Dementia (HCC)     Dysphagia     GERD (gastroesophageal reflux disease)     Hypercholesterolemia     Hypertension     Pleural effusion     Polyneuropathy     UTI (urinary tract infection)        Past Surgical History:   Procedure Laterality Date    HX APPENDECTOMY      HX CATARACT REMOVAL      HX ORTHOPAEDIC      L knee, right ankle, joint replacement, hammer toe release, bunionectomy       Family History   Problem Relation Age of Onset    Breast Cancer Mother     COPD Father     Breast Cancer Sister        Social History     Socioeconomic History    Marital status:        Prior to Admission medications    Not on File       Allergies   Allergen Reactions    Alteplase Angioedema    Celebrex [Celecoxib] Unknown (comments)     Medication reaction not listed on paperwork from facility    Enalapril Unknown (comments)     Medication reaction not listed on paperwork from facility    Meloxicam Unknown (comments)     Medication reaction not listed on paperwork from facility       Review of Systems  Unobtainable due to dementia    Physical Exam:     Physical Exam:  Visit Vitals  BP (!) 122/53 (BP 1 Location: Left upper arm, BP Patient Position: At rest;Lying)   Pulse 77   Temp (!) 95.9 °F (35.5 °C)   Resp 26   Ht 5' 4\" (1.626 m)   Wt 77.1 kg (170 lb)   SpO2 93%   BMI 29.18 kg/m²      O2 Device: None (Room air)    Temp (24hrs), Av.1 °F (35.6 °C), Min:95.4 °F (35.2 °C), Max:97.1 °F (36.2 °C)    No intake/output data recorded. No intake/output data recorded. General:  Awake, climbing out of bed, A&Ox1. Head:  Normocephalic, without obvious abnormality, atraumatic. Eyes:  Conjunctivae/corneas clear, sclera anicteric. Neck: Supple, symmetrical, trachea midline.    Lungs:   Clear to auscultation bilaterally. Heart:  Regular rate and rhythm, S1, S2 normal, no murmur, click, rub or gallop. Abdomen: Soft, non-tender. Bowel sounds normal. No masses,  No organomegaly. Extremities: Extremities normal, atraumatic, no cyanosis. Chronic right leg edema from previous orthopedic surgery. Capillary refill normal.   Pulses: 2+ and symmetric all extremities. Skin: Skin color pink, turgor increased. No rashes or lesions   Neurologic: No focal motor or sensory deficit. Labs Reviewed: All lab results for the last 24 hours reviewed. Recent Results (from the past 24 hour(s))   BLOOD GAS, ARTERIAL POC    Collection Time: 12/30/21  1:30 AM   Result Value Ref Range    Device: NASAL CANNULA      pH (POC) 7.40 7.35 - 7.45      pCO2 (POC) 42.8 35.0 - 45.0 MMHG    pO2 (POC) 59 (L) 80 - 100 MMHG    HCO3 (POC) 26.5 (H) 22 - 26 MMOL/L    sO2 (POC) 90.5 (L) 92 - 97 %    Base excess (POC) 1.4 mmol/L    Allens test (POC) Positive      Site RIGHT RADIAL      Patient temp.  98      Specimen type (POC) ARTERIAL      Performed by Presentain    EKG, 12 LEAD, INITIAL    Collection Time: 12/30/21  1:35 AM   Result Value Ref Range    Ventricular Rate 75 BPM    QRS Duration 82 ms    Q-T Interval 398 ms    QTC Calculation (Bezet) 444 ms    Calculated R Axis -29 degrees    Calculated T Axis -4 degrees    Diagnosis       Atrial fibrillation  Abnormal ECG  No previous ECGs available     CBC WITH AUTOMATED DIFF    Collection Time: 12/30/21  2:02 AM   Result Value Ref Range    WBC 8.7 4.6 - 13.2 K/uL    RBC 2.97 (L) 4.20 - 5.30 M/uL    HGB 9.5 (L) 12.0 - 16.0 g/dL    HCT 32.2 (L) 35.0 - 45.0 %    .4 (H) 78.0 - 100.0 FL    MCH 32.0 24.0 - 34.0 PG    MCHC 29.5 (L) 31.0 - 37.0 g/dL    RDW 15.9 (H) 11.6 - 14.5 %    PLATELET 058 204 - 070 K/uL    MPV 11.3 9.2 - 11.8 FL    NRBC 0.2 (H) 0  WBC    ABSOLUTE NRBC 0.02 (H) 0.00 - 0.01 K/uL    NEUTROPHILS 82 (H) 40 - 73 %    LYMPHOCYTES 10 (L) 21 - 52 %    MONOCYTES 7 3 - 10 % EOSINOPHILS 1 0 - 5 %    BASOPHILS 0 0 - 2 %    IMMATURE GRANULOCYTES 1 (H) 0.0 - 0.5 %    ABS. NEUTROPHILS 7.1 1.8 - 8.0 K/UL    ABS. LYMPHOCYTES 0.9 0.9 - 3.6 K/UL    ABS. MONOCYTES 0.6 0.05 - 1.2 K/UL    ABS. EOSINOPHILS 0.1 0.0 - 0.4 K/UL    ABS. BASOPHILS 0.0 0.0 - 0.1 K/UL    ABS. IMM. GRANS. 0.0 0.00 - 0.04 K/UL    DF AUTOMATED     METABOLIC PANEL, COMPREHENSIVE    Collection Time: 12/30/21  2:02 AM   Result Value Ref Range    Sodium 144 136 - 145 mmol/L    Potassium 4.9 3.5 - 5.5 mmol/L    Chloride 112 (H) 100 - 111 mmol/L    CO2 29 21 - 32 mmol/L    Anion gap 3 3.0 - 18 mmol/L    Glucose 105 (H) 74 - 99 mg/dL    BUN 44 (H) 7.0 - 18 MG/DL    Creatinine 1.88 (H) 0.6 - 1.3 MG/DL    BUN/Creatinine ratio 23 (H) 12 - 20      GFR est AA 30 (L) >60 ml/min/1.73m2    GFR est non-AA 25 (L) >60 ml/min/1.73m2    Calcium 8.9 8.5 - 10.1 MG/DL    Bilirubin, total 0.7 0.2 - 1.0 MG/DL    ALT (SGPT) 19 13 - 56 U/L    AST (SGOT) 32 10 - 38 U/L    Alk.  phosphatase 65 45 - 117 U/L    Protein, total 7.0 6.4 - 8.2 g/dL    Albumin 2.7 (L) 3.4 - 5.0 g/dL    Globulin 4.3 (H) 2.0 - 4.0 g/dL    A-G Ratio 0.6 (L) 0.8 - 1.7     NT-PRO BNP    Collection Time: 12/30/21  2:02 AM   Result Value Ref Range    NT pro-BNP 11,027 (H) 0 - 1,800 PG/ML   TROPONIN-HIGH SENSITIVITY    Collection Time: 12/30/21  2:02 AM   Result Value Ref Range    Troponin-High Sensitivity 37 0 - 54 ng/L   LIPASE    Collection Time: 12/30/21  2:02 AM   Result Value Ref Range    Lipase 146 73 - 393 U/L   MAGNESIUM    Collection Time: 12/30/21  2:02 AM   Result Value Ref Range    Magnesium 2.6 1.6 - 2.6 mg/dL   BLOOD GAS,CHEM8,LACTIC ACID POC    Collection Time: 12/30/21  2:02 AM   Result Value Ref Range    Calcium, ionized (POC) 1.19 1.12 - 1.32 mmol/L    Base excess (POC) 2.7 mmol/L    HCO3 (POC) 29.6 (H) 22 - 26 MMOL/L    CO2, POC 30 (H) 19 - 24 MMOL/L    O2 SAT 59 %    Sample source VENOUS BLOOD      Performed by Day Ranci     Sodium (POC) 149 (H) 136 - 145 mmol/L Potassium (POC) 4.3 3.5 - 5.1 mmol/L    Glucose (POC) 101 (H) 65 - 100 mg/dL    Creatinine (POC) 2.09 (H) 0.6 - 1.3 mg/dL    Lactic Acid (POC) 0.91 0.40 - 2.00 mmol/L    Chloride (POC) 107 98 - 107 mmol/L    Anion gap, POC 13 10 - 20      GFRAA, POC 27 (L) >60 ml/min/1.73m2    GFRNA, POC 22 (L) >60 ml/min/1.73m2    pH, venous (POC) 7.33 7.32 - 7.42      pCO2, venous (POC) 56.2 (H) 41 - 51 MMHG    pO2, venous (POC) 34 25 - 40 mmHg   TSH 3RD GENERATION    Collection Time: 12/30/21  2:02 AM   Result Value Ref Range    TSH 2.57 0.36 - 3.74 uIU/mL   URINALYSIS W/ RFLX MICROSCOPIC    Collection Time: 12/30/21  4:00 AM   Result Value Ref Range    Color YELLOW      Appearance TURBID      Specific gravity 1.019 1.005 - 1.030      pH (UA) 5.0 5.0 - 8.0      Protein 30 (A) NEG mg/dL    Glucose Negative NEG mg/dL    Ketone Negative NEG mg/dL    Bilirubin Negative NEG      Blood SMALL (A) NEG      Urobilinogen 0.2 0.2 - 1.0 EU/dL    Nitrites Positive (A) NEG      Leukocyte Esterase LARGE (A) NEG     URINE MICROSCOPIC ONLY    Collection Time: 12/30/21  4:00 AM   Result Value Ref Range    WBC TOO NUMEROUS TO COUNT 0 - 5 /hpf    RBC 0 to 3 0 - 5 /hpf    Epithelial cells FEW 0 - 5 /lpf    Bacteria 2+ (A) NEG /hpf   COVID-19 RAPID TEST    Collection Time: 12/30/21  5:30 AM   Result Value Ref Range    Specimen source Nasopharyngeal      COVID-19 rapid test Not detected NOTD       Results  CT HEAD WO CONT (Accession [id number]) (Order [id number])    Allergies       Not Specified: Alteplase;  Celebrex [Celecoxib];   Enalapril;  Meloxicam     Exam Information    Status Exam Begun  Exam Ended    Final [99] 12/30/2021 01:59 12/30/2021  2:49 AM [id number]  2:49 AM     Result Information    Status: Final result (Exam End: 12/30/2021 02:49) Provider Status: Open       CT HEAD WO CONT: Patient Communication     Released  Not seen     Study Result    Narrative & Impression   EXAM: CT HEAD, WITHOUT IV CONTRAST     INDICATION: Shortness of breath, altered level of consciousness     COMPARISON: None     TECHNIQUE: Multiple axial CT images of the head were obtained extending from the  skull base through the vertex. Additional coronal and sagittal reformations were  also performed. One or more dose reduction techniques were used on this CT:  automated exposure control, adjustment of the mAs and/or kVp according to  patient size, and iterative reconstruction techniques. The specific techniques  used on this CT exam have been documented in the patient's electronic medical  record. Digital Imaging and Communications in Medicine (DICOM) format image  data are available to nonaffiliated external healthcare facilities or entities  on a secure, media free, reciprocally searchable basis with patient  authorization for at least a 12-month period after this study.        _______________     FINDINGS:     BRAIN AND POSTERIOR FOSSA: There is generalized prominence of the ventricular  system, associated with proportional widening of the cortical cerebral sulci,  compatible with generalized volume loss. Hazy hypoattenuation identified along  the periventricular white matter, a nonspecific finding which is most commonly  encountered in the setting of chronic microvascular disease. There is no  intracranial hemorrhage, mass effect, or midline shift. There are no  significant additional areas of abnormal parenchymal attenuation.     EXTRA-AXIAL SPACES AND MENINGES: There are no abnormal extra-axial fluid  collections.     CALVARIUM: No acute osseous abnormality.     OTHER: The visualized portions of the paranasal sinuses and mastoid air cells  are clear.     _______________     IMPRESSION     1. No CT evidence of an acute intracranial abnormality - in particular, no  acute cortical infarct, hemorrhage, or mass effect.     2.  Moderate cerebral atrophy/volume loss and periventricular white matter  changes, most commonly seen with chronic microvascular disease.     Results  CT HEAD WO CONT (Accession [id number]) (Order [id number])    Allergies       Not Specified: Alteplase;  Celebrex [Celecoxib]; Enalapril;  Meloxicam     Exam Information    Status Exam Begun  Exam Ended    Final [99] 12/30/2021 01:59 12/30/2021  2:49 AM [id number]  2:49 AM     Result Information    Status: Final result (Exam End: 12/30/2021 02:49) Provider Status: Open       CT HEAD WO CONT: Patient Communication     Released  Not seen     Study Result    Narrative & Impression   EXAM: CT HEAD, WITHOUT IV CONTRAST     INDICATION: Shortness of breath, altered level of consciousness     COMPARISON: None     TECHNIQUE: Multiple axial CT images of the head were obtained extending from the  skull base through the vertex. Additional coronal and sagittal reformations were  also performed. One or more dose reduction techniques were used on this CT:  automated exposure control, adjustment of the mAs and/or kVp according to  patient size, and iterative reconstruction techniques. The specific techniques  used on this CT exam have been documented in the patient's electronic medical  record. Digital Imaging and Communications in Medicine (DICOM) format image  data are available to nonaffiliated external healthcare facilities or entities  on a secure, media free, reciprocally searchable basis with patient  authorization for at least a 12-month period after this study.        _______________     FINDINGS:     BRAIN AND POSTERIOR FOSSA: There is generalized prominence of the ventricular  system, associated with proportional widening of the cortical cerebral sulci,  compatible with generalized volume loss. Hazy hypoattenuation identified along  the periventricular white matter, a nonspecific finding which is most commonly  encountered in the setting of chronic microvascular disease. There is no  intracranial hemorrhage, mass effect, or midline shift.   There are no  significant additional areas of abnormal parenchymal attenuation.     EXTRA-AXIAL SPACES AND MENINGES: There are no abnormal extra-axial fluid  collections.     CALVARIUM: No acute osseous abnormality.     OTHER: The visualized portions of the paranasal sinuses and mastoid air cells  are clear.     _______________     IMPRESSION     1. No CT evidence of an acute intracranial abnormality - in particular, no  acute cortical infarct, hemorrhage, or mass effect. 2.  Moderate cerebral atrophy/volume loss and periventricular white matter  changes, most commonly seen with chronic microvascular disease.

## 2021-12-30 NOTE — ACP (ADVANCE CARE PLANNING)
Advance Care Planning     General Advance Care Planning (ACP) Conversation  Date of Conversation: 2021 1010  Conducted with: Healthcare Decision Maker: Next of Kin by law (only applies in absence of a Healthcare Power of  or Legal Guardian) [id number] Decision Maker:     Primary Decision Maker: Catherine Arana - Daughter - [phone]    Today we documented Decision Maker(s) consistent with Legal Next of Kin hierarchy. Per [facility], there were three children in the family. Bao Colon is . Ivone Cherry has MPOA but he has had a recent MI and CVA and is unable to act in the role of MPOA. [facility] is the legal next of kin    Content/Action Overview: Has ACP document(s) NOT on file - requested [facility] to provide. I contacted the [facility] to fax a copy over. Reviewed DNR/DNI and daughter, Codie Muller confirms current DNR status     2021 0845 Seen today in room ED 8 along with Dallin Benson NP. Lying  In bed with oxygen on at 2.5 lpm per NC. Disoriented. Does trun to hearing her name called. Came to the ED from the [address] [place] [address],[address] via EMS on 2021 for altered mental status and shortness of breath. Found to be hypothermic. (+) UTI    PMH significant for advanced dementia, chronic atrial fibrillation, CKD, history of stroke s/p TPA 2021 (required intubation for acute allergic reaction to thrombolytic), HTN, dyslipidemia    The palliative care team has been consulted for goals of care discussion and review of formal documents for DNR    1010: telephone call with Liliam Solorzano, daughter. Introduced the role of palliative medicine for the hospitalized patient. Lives in a facility [facility])    [facility] explained her mother's recent [facility] at [facility] and that they are hopeful for improvement in physical state but understand her mother has dementia which is not reversible.     Disposition plan: to be determined based on response to treatment and family decisions      Palliative care will continue to follow Shreyas Randall  and her family during her hospitalization and support them as they make healthcare decisions and define goals of care.       Chuck Oakley RN, MSN  Palliative Medicine  P: [phone]

## 2021-12-30 NOTE — ED NOTES
Came in to check on patient and patient was having increased difficulty breathing. 02 was at 88%. o2 raised to 5L from 3L NC is at 92%. MD called will asked for PRN duo-neb. Due to wheezing. Called RESP to also come check on the patient.

## 2021-12-30 NOTE — ED NOTES
Pt is aaox1 to person. Pt has dementia. Pt in soft restraints due to line pulling. Pt states she is hot even though  She has a low temp. Pt mouth is extremely dry. used mouth swabs patient states she feels better and knows I am the nurse.

## 2021-12-30 NOTE — PROGRESS NOTES
Pharmacy Dosing Services: Renal Dosing    The following medication: Levofloxacin, cefepime was automatically dose-adjusted per [facility] P&T Committee Protocol, with respect to renal function. Consult provided for this   80 y.o. , female , for the indication of UTI. Pt Weight:   Wt Readings from Last 1 Encounters:   12/30/21 77.1 kg (170 lb)     Dosage changed to:  Levofloxacin 750 mg IV once then 500 mg IV q48h  Cefepime 2 g IV q24h      Previous Regimen Levofloxacin 750 mg IV q24h  Cefepime 2 g IV q8h   Serum Creatinine Lab Results   Component Value Date/Time    Creatinine 1.88 (H) 12/30/2021 02:02 AM    Creatinine (POC) 2.09 (H) 12/30/2021 02:02 AM       Creatinine Clearance Estimated Creatinine Clearance: 18.8 mL/min (A) (based on SCr of 1.88 mg/dL (H)). BUN Lab Results   Component Value Date/Time    BUN 44 (H) 12/30/2021 02:02 AM         Pharmacy to continue to monitor patient daily. Will make dosage adjustments based upon changing renal function.   Signed Dodie Tolbert PHARMD. Contact information:

## 2021-12-30 NOTE — PROGRESS NOTES
Speech-Language Pathology Evaluation Attempt     Chart reviewed. Attempted Speech-Language Pathology Evaluation/Treatment, however, patient unable to be seen due to:  []  Nausea/vomiting  []  Eating  []  Pain  []  Patient too lethargic  []  Off Unit for testing/procedure  []  Dialysis treatment in progress   []  Telemetry Results  [x]  Other: Patient experiencing dyspnea, oxygen desaturation to 85 per nursing report. Recommend NPO until patient able to safely tolerate PO trials.       Will f/u later as patient's schedule allows. Thank you for this referral.  SARATH Gonzalez.Ed, [id number] [facility]

## 2021-12-31 NOTE — PROGRESS NOTES
12/31/21 0518   Oxygen Therapy   O2 Sat (%) 95 %   O2 Device Ventimask   O2 Flow Rate (L/min) 8 l/min  (decreased post abg result)   FIO2 (%) 40 %  (decreased post abg result)

## 2021-12-31 NOTE — PROGRESS NOTES
28-year-old female with advanced dementia, hypertension, chronic atrial fibrillation, CKD stage III, and stroke last month is admitted for acute metabolic encephalopathy, hypothermia, acute on chronic renal failure, acute respiratory failure with hypoxemia, and UTI. Patient's proBNP is very elevated and over 11,000. Experience respiratory distress earlier with wheezing -  Given DuoNeb not sure if coincidence or not but seem to become more agitated and confused after. Switch patient to Xopenex nebs as needed for cough or wheezing or respiratory distress. Chest x-ray clear, but patient is proBNP very elevated and over 11,000 and patient PO2 level low at 62. Patient's O2 sats improved with supplemental oxygen. Patient has a history of dementia is very agitated. Will order prn medications for agitation. Will order CT chest w/o constrast given pt's renal status    Medications  Reconcile with Patient's Chart    Medication Sig Dispensed Refills Start Date End Date Status   triamcinolone (KENALOG) 0.1 % cream   Apply to affected area 2 times daily as needed. Avoid face and groin. 80 g   5 09/03/2020   Active   acetaminophen (TYLENOL) 500 MG tablet    Indications: Pain Take 2 tablets (1,000 mg total) by mouth in the morning and at bedtime Indications: Pain 120 tablet   0 12/17/2021 01/16/2022 Active   torsemide (DEMADEX) 20 MG tablet    Indications: Edema, Hypertension Take 1 tablet (20 mg total) by mouth daily Indications: Edema, High Blood Pressure Disorder 30 tablet   0 12/17/2021 01/16/2022 Active   thiamine 100 MG tablet    Indications:  Thiamine Deficiency Take 1 tablet (100 mg total) by mouth daily Indications: Deficiency of Vitamin B1 30 tablet   0 12/17/2021 01/16/2022 Active   omeprazole (PriLOSEC) 10 MG capsule    Indications: Gastroesophageal Reflux Disease Take 1 capsule (10 mg total) by mouth daily Indications: Gastroesophageal Reflux Disease 30 capsule   0 12/17/2021 01/16/2022 Active isosorbide mononitrate (IMDUR) 30 MG 24 hr tablet    Indications: Hypertension, cerebral infarction Take 1 tablet (30 mg total) by mouth daily Indications: Hypertension, cerebral infarction 30 tablet   0 12/17/2021 01/16/2022 Active   gabapentin (NEURONTIN) 100 MG capsule    Indications: Neuropathy (CMS/HCC) Take 1 capsule (100 mg total) by mouth in the morning and at bedtime 60 capsule   0 12/17/2021 01/16/2022 Active   ferrous sulfate 325 (65 Fe) MG tablet    Indications: Iron Deficiency Take 1 tablet (325 mg total) by mouth daily with breakfast Indications: Iron Deficiency 30 tablet   0 12/17/2021 01/16/2022 Active   docusate sodium (COLACE) 100 MG capsule    Indications: Constipation Take 1 capsule (100 mg total) by mouth 2 (two) times a day Indications: Constipation 60 capsule   0 12/17/2021 01/16/2022 Active   diltiazem CD (CARDIZEM CD) 240 MG 24 hr capsule    Indications: Atrial Fibrillation Take 1 capsule (240 mg total) by mouth daily Indications: Atrial Fibrillation DO NOT OPEN, CRUSH, OR CHEW.  SWALLOW WHOLE. 30 capsule   0 12/17/2021 01/16/2022 Active   carvedilol (COREG) 6.25 MG tablet   Take 1 tablet (6.25 mg total) by mouth 2 (two) times a day with meals 60 tablet   0 12/17/2021 01/16/2022 Active   Calcium Carb-Cholecalciferol (Calcium Carbonate-Vitamin D3) 600-400 MG-UNIT tablet    Indications: Hypocalcemia Take 1 tablet by mouth daily Indications: Low Amount of Calcium in the Blood 30 tablet   0 12/17/2021 01/16/2022 Active   Cyanocobalamin (B-12) 1000 MCG tablet controlled-release    Indications: Vitamin B12 Deficiency Take 1,000 mcg by mouth daily Indications: Inadequate Vitamin B12 30 tablet   0 12/17/2021 01/16/2022 Active   atorvastatin (LIPITOR) 40 MG tablet    Indications: Hyperlipidemia Take 1 tablet (40 mg total) by mouth At Bedtime Indications: High Amount of Fats in the Blood 30 tablet   0 12/17/2021 01/16/2022 Active   apixaban (ELIQUIS) 2.5 MG tablet    Indications: Atrial Fibrillation Take 1 tablet (2.5 mg total) by mouth 2 (two) times a day Indications: Atrial Fibrillation 60 tablet   0 12/17/2021 01/16/2022 Active   nystatin (MYCOSTATIN) powder    Indications: Cutaneous Candidiasis Indications: Skin Infection due to Candida Yeast Apply to between/under breasts daily PRN 60 g   0 12/17/2021   Active

## 2021-12-31 NOTE — PROGRESS NOTES
Hospitalist Progress Note    Patient: Sarah Mccall MRN: [id number]  CSN: [id number]    YOB: 1928  Age: 80 y.o. Sex: female    DOA: 12/30/2021 LOS:  LOS: 1 day            Assessment/Plan   1. Acute hypoxemic respiratory failure w numerous ground glass infiltrates of bilat lungs and focal dense consolidation in LLL. Possible COVID pneumonia  2. delilrium superimposed on dementia ( metabolic encephalopathy)  3. SERVANDO  4. Anemia with out signs of infection  5. GNR UTI  6. CVA  7. afib on anticoagulation      Plan:  - continue IV antibiotics  ( ceftriaxone/ levaquin)  - add dexamethasone, high dose  - add respiratory viral panel w COVID PCR  - follow culture data  - [facility] regarding current clinical picture and overal clinical condition. Discussed goals of care at length. She is considering comfort measures but wishes to discuss w her family  - DNR/DNI  - anticoagulted w eliquis        Addendum 2:19 PM  Pt  Family elects comfort measures only    Patient Active Problem List   Diagnosis Code    UTI (urinary tract infection) N39.0    Acute metabolic encephalopathy C46.19    Dementia ([facility] [address] F03.90    Hypothermia T68. Ernestina Mires    HTN (hypertension) I10    Chronic atrial fibrillation (HCC) I48.20    CKD (chronic kidney disease) stage 3, GFR 30-59 ml/min (HCC) N18.30    Acute on chronic renal failure (HCC) N17.9, N18.9    Acute respiratory failure with hypoxemia (HCC) J96.01    History of stroke Z86.73    Anticoagulated Z79.01               Subjective:    cc: shortness of breath  Pt tachypneic, does not respond to verbal stimuli      REVIEW OF SYSTEMS:  Unable to obtain due to a renetta. Objective:        Vital signs/Intake and Output:  Visit Vitals  /67   Pulse 95   Temp 98 °F (36.7 °C)   Resp 20   Ht 5' 4\" (1.626 m)   Wt 80.7 kg (177 lb 14.6 oz)   SpO2 95%   BMI 30.54 kg/m²     Current Shift:  No intake/output data recorded.   Last three shifts:  No intake/output data recorded. Body mass index is 30.54 kg/m². Physical Exam:  GEN: unsresponvie   EYES: conjunctiva normal, lids with out lesions  HEENT: MMM, No thyromegaly, no lymphadenopathy  HEART:  +S1 +S2, no JVD, pulses 2+ distally  LUNGS: CTA B/L no wheezes, rales or rhonchi  ABDOMEN: + BS, soft NT/ND no organomegaly,  No rebound  EXTREMITIES: No edema cyanosis, cap refill normal   SKIN: no rashes or skin breakdown, no nodules, normal turgor  Current Facility-Administered Medications   Medication Dose Route Frequency    haloperidol lactate (HALDOL) injection 2 mg  2 mg IntraVENous Q4H PRN    [START ON 1/1/2022] levoFLOXacin (LEVAQUIN) 500 mg in D5W IVPB  500 mg IntraVENous Q48H    cefepime (MAXIPIME) 2 g in 0.9% sodium chloride (MBP/ADV) 100 mL MBP  2 g IntraVENous Q24H    sodium chloride (NS) flush 5-40 mL  5-40 mL IntraVENous Q8H    sodium chloride (NS) flush 5-40 mL  5-40 mL IntraVENous PRN    acetaminophen (TYLENOL) tablet 650 mg  650 mg Oral Q6H PRN    Or    acetaminophen (TYLENOL) suppository 650 mg  650 mg Rectal Q6H PRN    polyethylene glycol (MIRALAX) packet 17 g  17 g Oral DAILY PRN    ondansetron (ZOFRAN ODT) tablet 4 mg  4 mg Oral Q8H PRN    Or    ondansetron (ZOFRAN) injection 4 mg  4 mg IntraVENous Q6H PRN    famotidine (PF) (PEPCID) 20 mg in 0.9% sodium chloride 10 mL injection  20 mg IntraVENous DAILY    apixaban (ELIQUIS) tablet 2.5 mg  2.5 mg Oral BID    atorvastatin (LIPITOR) tablet 40 mg  40 mg Oral QHS    cyanocobalamin tablet 1,000 mcg  1,000 mcg Oral DAILY    albumin human 25% (BUMINATE) solution 25 g  25 g IntraVENous Q6H    levalbuterol (XOPENEX) nebulizer soln 0.63 mg/3 mL  0.63 mg Nebulization Q4H PRN         All the patient's labs over the past 24 hours were reviewed both during my initial daily workflow process and at the time notated as \"note time\" in [address] [place] [address].   (It is not time stamped separately in this workflow.)  Select labs are listed below.         Labs: Results:       Chemistry Recent Labs     12/31/21 0236 12/30/21  0202   GLU 83 105*   * 144   K 4.4 4.9   * 112*   CO2 25 29   BUN 38* 44*   CREA 1.70* 1.88*   CA 8.8 8.9   AGAP 6 3   BUCR 22* 23*   AP 66 65   TP 7.2 7.0   ALB 3.6 2.7*   GLOB 3.6 4.3*   AGRAT 1.0 0.6*      CBC w/Diff Recent Labs     12/31/21 0236 12/30/21 0202   WBC 11.8 8.7   RBC 2.67* 2.97*   HGB 8.8* 9.5*   HCT 29.9* 32.2*    242   GRANS  --  82*   LYMPH  --  10*   EOS  --  1                      Liver Enzymes Recent Labs     12/31/21 0236   TP 7.2   ALB 3.6   AP 66      Thyroid Studies Lab Results   Component Value Date/Time    TSH 2.57 12/30/2021 02:02 AM        Procedures/imaging: see electronic medical records for all procedures/Xrays and details which were not copied into this note but were reviewed prior to creation of Michael 98, DO  Internal Medicine/Geriatrics

## 2021-12-31 NOTE — PROGRESS NOTES
2033 The patient arrived via stretcher from the ED to 341. She is alert to self, minimal response to questions and follows instructions poorly. O2 SATs on 4L/min NC is 84%. With 6L/min it increased to 85%. Placed the patient on a NRB and it increased to 94%. Other vitals are stable. The patient is in bilateral soft wrist restraints. The order is current. Will notify Dr Raj Delaney of the O2 needs, request the restraint order to be renewed and let her know it will not be safe to give this patient anything oral tonight. Will call RT to request assistance with the best plan for the oxygen needs. Visit Vitals  /73   Pulse 99   Temp 97.7 °F (36.5 °C)   Resp 28   Ht 5' 4\" (1.626 m)   Wt 77.1 kg (170 lb)   SpO2 93%   BMI 29.18 kg/m²     2045 RT placed the patient on a venti-mask. She is a mouth breather. O2 SATs are 94%. 2321 No change in condition. The patient appears comfortable and when asked if she was warm enough she said \"yes. \" No needs identified at this time. 2780 RT reports the venti-mask was changed to 40% after ABG results. O2 SATs remain WNL. No needs identified at this time.

## 2021-12-31 NOTE — ROUTINE PROCESS
Bedside and Verbal shift change report given to Karen Meyer RN  (oncoming nurse) by Ronn Holder RN  (offgoing nurse). Report given with SBAR, Kardex, Intake/Output and Recent Results.

## 2021-12-31 NOTE — PROGRESS NOTES
Reason for Admission:   Altered mental status, shortness of breath                    RUR Score: mod; 15%                 PCP: First and Last name:   Darby Camarena MD     Name of Practice:    Are you a current patient: Yes/No:    Approximate date of last visit:    Can you participate in a virtual visit if needed:     Do you (patient/family) have any concerns for transition/discharge? Plan for utilizing home health:       Current Advanced Directive/Advance Care Plan:  DNR      Healthcare Decision Maker:   Click here to complete [employer] including selection of the Healthcare Decision Maker Relationship (ie \"Primary\")            Primary Decision Maker: Jenni Mejia Daughter - [phone]    Transition of Care Plan:      Return to [facility]    Chart reviewed. Per H&P Mello Moctezuma is a 80 y.o. female with advanced dementia, hypertension, chronic atrial fibrillation, CKD stage III, and stroke last month who presents via EMS for altered mental status and hypoxia at her rehab facility. The staff found her scratching at her tongue and so they checked her vital signs. Her oxygen level was in the 80s so they brought her to the hospital.  Her daughter reports that she has been in the rehab facility since her discharge last month from [facility] with a stroke. She is not sure if she has been eating or drinking since she is not usually visiting during mealtime. She says that her dementia wax and wanes rapidly. 1 day she will be very happy in the next she will not speak at all. She does not normally have an oxygen requirement. History is unobtainable from the patient due to dementia. \"      CM spoke with provider and have been notified pt is medically stable to return to [facility] with comfort measures. CM contacted the [facility]ison with [facility] and have been notified they are able to accept this pt tomorrow.    CM will reach out to pt's family to discuss transition of care.       Care Management Interventions  Palliative Care Criteria Met (RRAT>21 & CHF Dx)?: No  Mode of Transport at Discharge: BLS  Transition of Care Consult (CM Consult): Discharge Planning  Health Maintenance Reviewed: Yes  Support Systems: Child(silvana)  The Plan for Transition of Care is Related to the Following Treatment Goals : Return to [facility]  The Patient and/or Patient Representative was Provided with a Choice of Provider and Agrees with the Discharge Plan?: Yes  Name of the Patient Representative Who was Provided with a Choice of Provider and Agrees with the Discharge Plan: daughter  Freedom of Choice List was Provided with Basic Dialogue that Supports the Patient's Individualized Plan of Care/Goals, Treatment Preferences and Shares the Quality Data Associated with the Providers?: Yes  Discharge Location  Discharge Placement: [address] [place] [address]

## 2021-12-31 NOTE — PROGRESS NOTES
D/C Plan: [facility] 1/1/2022    CM spoke with pt's daughter, Johanny Hair ([phone]), to discuss transition of care. Family is agreeable to pt returning to the above facility. CM has requested the floor arrange a 1:00 transport tomorrow. Please contact pt's daughter with confirmed transport time. Family has been made aware that transition to hospice would entail paying privately for room and board. Transition of care to SNF: [facility]     Communication to Patient/Family:  Met with patient and family, and they are agreeable to the transition plan. The Plan for Transition of Care is related to the following treatment goals: LTC     The Patient and/or patient representative was provided with a choice of provider and agrees   with the discharge plan. Yes [x] No []    Freedom of choice list was provided with basic dialogue that supports the patient's individualized plan of care/goals and shares the quality data associated with the providers. Yes [x] No []    SNF/Rehab Transition:  Patient has been accepted to [address] [place] [address],[address], [address] [facility] and meets criteria for admission. Patient will transported by medical transport and expected to leave tomorrow at 1:00pm.    Communication to SNF/Rehab:  Bedside RN, has been notified to update the transition plan to the facility and call report [phone]; please fax discharge summary to [phone]     Discharge information has been updated on the AVS and sent via [facility] and/or CC link. Discharge instructions to be fax'd to facility at [phone]     Please include all hard scripts for controlled substances, med rec and dc summary, and AVS in packet. Please medicate for pain prior to dc if possible and needed to help offset delay when patient first arrives to facility.     Reviewed and confirmed with facility, [facility] , can manage the patient care needs for the following:     Nonnie Kocher with (X) only those applicable:  Medication:  []Medications are available at the facility  []IV Antibiotics    []Controlled Substance  hard copies available sent. []Weekly Labs    Equipment:  []CPAP/BiPAP  []Wound Vacuum  []Browning or Urinary Device  []PICC/Central Line  []Nebulizer  []Ventilator    Treatment:  []Isolation (for MRSA, VRE, etc.)  []Surgical Drain Management  []Tracheostomy Care  []Dressing Changes  []Dialysis with transportation  []PEG Care  []Oxygen  []Daily Weights for Heart Failure    Dietary:  []Any diet limitations  []Tube Feedings   []Total Parenteral Management (TPN)    Financial Resources:  []Medicaid Application Completed    []UAI Completed and copy given to pt/family. []A screening has previously been completed. []Level II Completed    [] Private pay individual who will not become financially eligible for Medicaid within 6 months from admission to a [address] [place] [address] facility. [] Individual refused to have screening conducted. []Medicaid Application Completed    []The screening denied because it was determined individual did not need/did not qualify for nursing facility level of care. [] Out of state residents seeking direct admission to a [address] facility. [] Individuals who are inpatients of an out of state hospital, or in state or out of state veterans/ hospital and seek direct admission to a [address] facility  [] Individuals who are pateints or residents of a state owned/operated facility that is licensed by Department of [employer] (D[employer]) and seek direct admission to [address] [place] [address]  [] A screening not required for enrollment in [address] [address] [address] services as set out in [address] [place] [address] 28-  [] [facility]) staff shall perform screenings of the [facility] clients. Advanced Care Plan:  []Surrogate Decision Maker of Care  []POA  []Communicated Code Status and copy sent.     Other:         Care Management Interventions  Palliative Care Criteria Met (RRAT>21 & CHF Dx)?: No  Mode of Transport at Discharge: BLS  Transition of Care Consult (CM Consult): Discharge Planning  Health Maintenance Reviewed: Yes  Support Systems: Child(silvana)  The Plan for Transition of Care is Related to the Following Treatment Goals : [facility]  The Patient and/or Patient Representative was Provided with a Choice of Provider and Agrees with the Discharge Plan?: Yes  Name of the Patient Representative Who was Provided with a Choice of Provider and Agrees with the Discharge Plan: daughter  Freedom of Choice List was Provided with Basic Dialogue that Supports the Patient's Individualized Plan of Care/Goals, Treatment Preferences and Shares the Quality Data Associated with the Providers?: Yes  Discharge Location  [facility]

## 2021-12-31 NOTE — PROGRESS NOTES
Problem: Non-Violent Restraints  Goal: Removal from restraints as soon as assessed to be safe  Outcome: Progressing Towards Goal  Goal: No harm/injury to patient while restraints in use  Outcome: Progressing Towards Goal  Goal: Patient's dignity will be maintained  Outcome: Progressing Towards Goal  Goal: Patient Interventions  Outcome: Progressing Towards Goal     Problem: Falls - Risk of  Goal: *Absence of Falls  Description: Document Jennifer Westbrook Fall Risk and appropriate interventions in the flowsheet.   Outcome: Progressing Towards Goal  Note: Fall Risk Interventions:  Mobility Interventions: Communicate number of staff needed for ambulation/transfer    Mentation Interventions: Adequate sleep, hydration, pain control    Medication Interventions: Evaluate medications/consider consulting pharmacy    Elimination Interventions: Call light in reach

## 2021-12-31 NOTE — PROGRESS NOTES
Spoke to Mami Tobias, RN 3N for CT of chest on patient--0600  RN states patient is routine and to put on for transport in the AM

## 2021-12-31 NOTE — PROGRESS NOTES
Pt daughter discussed with her brother and they agrees for comfort measures. Dr Pamela Rosa aware    1430 Pt placed on NC 6 litres and soft restraints removed. 1920 Bedside and Verbal shift change report given to Carolyn Jordan RN (oncoming nurse) by Sherice Ha RN (offgoing nurse). Report included the following information SBAR, Kardex, MAR, Recent Results and Cardiac Rhythm .

## 2022-01-01 LAB
BACTERIA SPEC CULT: ABNORMAL
CC UR VC: ABNORMAL
SERVICE CMNT-IMP: ABNORMAL

## 2022-01-01 PROCEDURE — 77010033678 HC OXYGEN DAILY

## 2022-01-01 PROCEDURE — 74011250636 HC RX REV CODE- 250/636: Performed by: INTERNAL MEDICINE

## 2022-01-01 RX ORDER — LORAZEPAM 2 MG/ML
0.5 CONCENTRATE ORAL
Qty: 30 ML | Refills: 0 | Status: SHIPPED | OUTPATIENT
Start: 2022-01-01

## 2022-01-01 RX ORDER — LORAZEPAM 2 MG/ML
0.5 CONCENTRATE ORAL
Status: DISCONTINUED | OUTPATIENT
Start: 2022-01-01 | End: 2022-01-01 | Stop reason: HOSPADM

## 2022-01-01 RX ORDER — MORPHINE SULFATE 20 MG/ML
10 SOLUTION ORAL
Qty: 30 ML | Refills: 0 | Status: SHIPPED | OUTPATIENT
Start: 2022-01-01 | End: 2022-01-04

## 2022-01-01 RX ORDER — MORPHINE SULFATE 20 MG/ML
10 SOLUTION ORAL
Status: DISCONTINUED | OUTPATIENT
Start: 2022-01-01 | End: 2022-01-01 | Stop reason: HOSPADM

## 2022-01-01 RX ADMIN — LORAZEPAM 1 MG: 2 INJECTION INTRAMUSCULAR; INTRAVENOUS at 05:06

## 2022-01-01 RX ADMIN — LORAZEPAM 1 MG: 2 INJECTION INTRAMUSCULAR; INTRAVENOUS at 04:26

## 2022-01-01 RX ADMIN — MORPHINE SULFATE 2 MG: 2 INJECTION, SOLUTION INTRAMUSCULAR; INTRAVENOUS at 02:07

## 2022-01-01 RX ADMIN — LORAZEPAM 1 MG: 2 INJECTION INTRAMUSCULAR; INTRAVENOUS at 06:35

## 2022-01-01 RX ADMIN — MORPHINE SULFATE 2 MG: 2 INJECTION, SOLUTION INTRAMUSCULAR; INTRAVENOUS at 04:26

## 2022-01-01 RX ADMIN — LORAZEPAM 1 MG: 2 INJECTION INTRAMUSCULAR; INTRAVENOUS at 02:28

## 2022-01-01 RX ADMIN — MORPHINE SULFATE 2 MG: 2 INJECTION, SOLUTION INTRAMUSCULAR; INTRAVENOUS at 05:10

## 2022-01-01 RX ADMIN — MORPHINE SULFATE 2 MG: 2 INJECTION, SOLUTION INTRAMUSCULAR; INTRAVENOUS at 06:35

## 2022-01-01 NOTE — PROGRESS NOTES
Bereavement Note:     responded to the death of Sarah Mccall, who is a 80 y.o., female, offering Spiritual Care to patient's family, see flow sheets for interventions. Date of Death: 2022    Extended Emergency Contact Information  Primary Emergency Contact: Peter Johnson  Address: [address] [place] [address], [facility] [address]  Mobile Phone: [phone]  Relation: Daughter                 YES      NO  UNKNOWN  Life Net   []        [x]    []   Eye Bank   [] [x] []   Medical Examiner  []        [x]  []   Going to [place]  []        [x] []      Autopsy   []        [x]         []   Sympathy Card  [x]        []  Bereavement Materials  [x]        []           Business Card Provided  [x]        []              Home: [address] will continue to follow family and will provide spiritual care as needed.    [address]   [phone] - Office

## 2022-01-01 NOTE — ROUTINE PROCESS
Bedside and Verbal shift change report given to Violetta Deras RN (oncoming nurse) by Polly Santillan RN (offgoing nurse). Report included the following information SBAR, Kardex, ED Summary, Intake/Output, MAR, Recent Results, Med Rec Status and Cardiac Rhythm NSR.     1950 Patient assessment completed. Patient is resting quietly with eyes wide open and chest rising and falling evenly. No signs of pain or discomfort. Patient is rest quietly with call bell within reach. 2200 Patient became increasingly anxious and pulled off her oxygen and looked like she was in pain. Medication was given in order to keep her from being in pain. Medication was given through out the night in order to keep the patient comfortable. The patient passed away at 21 . Family was notified.

## 2022-01-01 NOTE — PROGRESS NOTES
Problem: Non-Violent Restraints  Goal: Removal from restraints as soon as assessed to be safe  Outcome: Progressing Towards Goal  Goal: No harm/injury to patient while restraints in use  Outcome: Progressing Towards Goal  Goal: Patient's dignity will be maintained  Outcome: Progressing Towards Goal  Goal: Patient Interventions  Outcome: Progressing Towards Goal     Problem: Falls - Risk of  Goal: *Absence of Falls  Description: Document Edie Shaver Fall Risk and appropriate interventions in the flowsheet. Outcome: Progressing Towards Goal  Note: Fall Risk Interventions:  Mobility Interventions: Assess mobility with egress test,Patient to call before getting OOB    Mentation Interventions: Bed/chair exit alarm,Reorient patient    Medication Interventions: Assess postural VS orthostatic hypotension,Patient to call before getting OOB,Teach patient to arise slowly    Elimination Interventions: Bed/chair exit alarm,Call light in reach              Problem: Patient Education: Go to Patient Education Activity  Goal: Patient/Family Education  Outcome: Progressing Towards Goal     Problem: Risk for Spread of Infection  Goal: Prevent transmission of infectious organism to others  Description: Prevent the transmission of infectious organisms to other patients, staff members, and visitors.   Outcome: Progressing Towards Goal     Problem: Patient Education:  Go to Education Activity  Goal: Patient/Family Education  Outcome: Progressing Towards Goal

## 2022-01-01 NOTE — DISCHARGE SUMMARY
Discharge Summary  Subjective:       Admit Date: 12/30/2021  Discharge Date:  1/1/2022, 6:33 AM    Discharging Physician: Titus Daniel pager [phone]  Primary Care Provider:  Efraín Herrera MD    Patient ID:  Jojo Hernandez  80 y.o. female  5/1/1928    Reason for Admission:  Acute metabolic encephalopathy [Z64.00]  UTI (urinary tract infection) [N39.0]    Discharge Diagnosis:   1. Acute hypoxemic respiratory failure  Due to pnuemonia  2. delilrium superimposed on dementia ( metabolic encephalopathy)  3. SERVANDO  4. Anemia with out signs of infection  5. GNR UTI  6. CVA  7. afib on anticoagulation      Patient Active Problem List   Diagnosis Code    UTI (urinary tract infection) N39.0    Acute metabolic encephalopathy A07.44    Dementia ([facility] [address] F03.90    Hypothermia T68. [place]    HTN (hypertension) I10    Chronic atrial fibrillation (HCC) I48.20    CKD (chronic kidney disease) stage 3, GFR 30-59 ml/min (HCC) N18.30    Acute on chronic renal failure (HCC) N17.9, N18.9    Acute respiratory failure with hypoxemia (HCC) J96.01    History of stroke Z86.73    Anticoagulated Z79.01       Consultants:    None     Hospital Course:   Reason for admission ( Admitting HPI): \"   Jojo Hernandez is a 80 y.o. female with advanced dementia, hypertension, chronic atrial fibrillation, CKD stage III, and stroke last month who presents via EMS for altered mental status and hypoxia at her rehab facility. The staff found her scratching at her tongue and so they checked her vital signs. Her oxygen level was in the 80s so they brought her to the hospital.  Her daughter reports that she has been in the rehab facility since her discharge last month from [facility] with a stroke. She is not sure if she has been eating or drinking since she is not usually visiting during mealtime. She says that her dementia wax and wanes rapidly. 1 day she will be very happy in the next she will not speak at all.   She does not normally have an oxygen requirement. History is unobtainable from the patient due to dementia. \"    She was admitted to the hospitalist service. Her oxygen requirements increased and she clinically deteriorated. Goals of care were discussed w and she had elected comfort measures only. She is requiring frequent morphine and ativan administration. Addendum 7:11 AM  Pt  prior to transfer    Pertinent Imaging/ Operative procedures:   CT chest:\"      Numerous areas of patchy groundglass infiltrates throughout both lungs with  focal dense consolidation in the left lower lobe most likely due to infection or  inflammation including Covid pneumonia. Concomitant edema also possible. Minimal pleural effusions noted layering posteriorly with adjacent atelectasis.     Multiple borderline size mediastinal and probable hilar lymph nodes,  nonspecific, possibly reactive.     Narrowing of the tracheobronchial tree may be related to bronchomalacia. \"    CXR:\"   No active cardiopulmonary disease. Right midlung atelectasis/scarring. Probable  calcified right upper lobe granuloma. \"          Physical Exam on Discharge:  Visit Vitals  BP (!) 133/54   Pulse 97   Temp 97.5 °F (36.4 °C)   Resp 22   Ht 5' 4\" (1.626 m)   Wt 80.7 kg (177 lb 14.6 oz)   SpO2 95%   BMI 30.54 kg/m²     Body mass index is 30.54 kg/m². GEN:unresponsive  HEART:S1 S2  NEURO: nonfocal   Condition at discharge: stable    Discharge Medications  Current Discharge Medication List      START taking these medications    Details   LORazepam (INTENSOL) 2 mg/mL concentrated solution Take 0.25 mL by mouth every two (2) hours. Max Daily Amount: 6 mg. Qty: 30 mL, Refills: 0    Associated Diagnoses: Acute respiratory failure with hypoxemia ([address]      ! ! morphine (ROXANOL) 100 mg/5 mL (20 mg/mL) concentrated solution Take 0.5 mL by mouth every one (1) hour as needed for Shortness of Breath for up to 3 days.  Max Daily Amount: 240 mg.  Qty: 30 mL, Refills: 0    Associated Diagnoses: Acute respiratory failure with hypoxemia ([address]      ! ! morphine (ROXANOL) 100 mg/5 mL (20 mg/mL) concentrated solution Take 0.5 mL by mouth every two (2) hours for 3 days. Max Daily Amount: 120 mg.  Qty: 30 mL, Refills: 0    Associated Diagnoses: Acute respiratory failure with hypoxemia ([address]       ! ! - Potential duplicate medications found. Please discuss with provider.           Disposition: SNF   Follow up:  none  Restrictions: none    Diagnostic Imaging/Labs pending at discharge: none      DO [facility]  Internal Medicine/Geriatrics    Time Spent 40 minutes with >50% coordination of care          CC: Roger Beebe MD

## 2022-01-01 NOTE — PROGRESS NOTES
Called to pronounce Bryce Mendozadiandrashakila  On examination, pt is not responsive to verbal or painful stimuli. No audible cardiac or lung sounds by stethoscope. No palpable radial or carotid pulses. Pupils are fixed and dilated. No corneal or gag reflexes. Patient is dead. Time of Death 21 . Family notified.

## 2022-01-02 LAB
CALCULATED R AXIS, ECG10: -29 DEGREES
CALCULATED T AXIS, ECG11: -4 DEGREES
DIAGNOSIS, 93000: NORMAL
Q-T INTERVAL, ECG07: 398 MS
QRS DURATION, ECG06: 82 MS
QTC CALCULATION (BEZET), ECG08: 444 MS
VENTRICULAR RATE, ECG03: 75 BPM

## 2022-01-03 NOTE — PROGRESS NOTES
EMR reviewed. Patient noted to be in restraints within 24 hours of the time of death.  Information entered into internal log on: 01/03/22 @ 66 [phone] 21

## 2022-01-05 LAB
BACTERIA SPEC CULT: NORMAL
BACTERIA SPEC CULT: NORMAL
SERVICE CMNT-IMP: NORMAL
SERVICE CMNT-IMP: NORMAL

## 2022-01-05 NOTE — PROGRESS NOTES
Physician Progress Note      PATIENT:               Francia Mckeon  CSN #:                  [id number]  :                       1928  ADMIT DATE:       2021 12:54 AM  DISCH DATE:        2022 6:50 AM  RESPONDING  PROVIDER #:        Angy Maza MD          QUERY TEXT:    Pt admitted with UTI and pneumonia. Pt noted to have hypothermia, acute respiratory failure, and acute metabolic encephalopathy. If possible, please document in the progress notes and discharge summary if you are evaluating and /or treating any of the following: The medical record reflects the following:  Risk Factors:  Clinical Indicators: 81yo female presented w/ AMS, desaturations to 80s. Temp in ER noted to be 95.4 (R), 95.9 (R), 96.5. (Ax)  Pulse   RR 26-28  WBC   Lactic acid . 91  UA WBC TNTC, large LE, + nitrites  Rapid Covid Negative  ER: Sepsis is present at the time of admission. UTI, metabolic encephalopathy  H&P: admitted for acute metabolic encephalopathy, hypothermia, acute on chronic renal failure, acute respiratory failure with hypoxemia, and UTI. Acute metabolic encephalopathy likely due to UTI with hypothermia   CT Chest: Numerous areas of patchy groundglass infiltrates throughout both lungs with focal dense consolidation in the left lower lobe most likely due to infection or inflammation including Covid pneumonia.  CRP = 6.9   Progress note: Acute hypoxemic respiratory failure w numerous ground glass infiltrates of bilat lungs and focal dense consolidation in LLL.  Possible COVID pneumonia   Discharge summary: Acute hypoxemic respiratory failure  Due to pneumonia, delirium superimposed on dementia ( metabolic encephalopathy),SERVANDO,  GNR UTI  Treatment: IV Ceftriaxone, Iv levaquin, O2, comfort care    Shira Norris RN, BSN, [address]  [phone]  Options provided:  -- Sepsis, present on admission  -- Severe Sepsis with associated acute respiratory failure and metabolic encephalopathy, present on admission  -- Sepsis, developed after admission  -- Severe Sepsis with associated acute respiratory failure and metabolic encephalopathy, developed after admission: This patient was treated for Severe sepsis with associated acute respiratory failure and metabolic encephalopathy which was not present on admission. -- UTI and pneumonia POA without Sepsis  -- Sepsis was ruled out  -- Other - I will add my own diagnosis  -- Disagree - Not applicable / Not valid  -- Disagree - Clinically unable to determine / Unknown  -- Refer to Clinical Documentation Reviewer    PROVIDER RESPONSE TEXT:    This patient was treated for Severe sepsis with associated acute respiratory failure and metabolic encephalopathy which was present on admission.     Query created by: Angie Thomas on 1/4/2022 12:43 PM      Electronically signed by:  Kendal Fierro MD 1/4/2022 7:12 PM
